# Patient Record
Sex: MALE | Race: WHITE | NOT HISPANIC OR LATINO | Employment: OTHER | URBAN - METROPOLITAN AREA
[De-identification: names, ages, dates, MRNs, and addresses within clinical notes are randomized per-mention and may not be internally consistent; named-entity substitution may affect disease eponyms.]

---

## 2024-02-27 ENCOUNTER — APPOINTMENT (OUTPATIENT)
Dept: CARDIOLOGY | Facility: HOSPITAL | Age: 74
DRG: 378 | End: 2024-02-27
Payer: MEDICARE

## 2024-02-27 ENCOUNTER — APPOINTMENT (OUTPATIENT)
Dept: RADIOLOGY | Facility: HOSPITAL | Age: 74
DRG: 378 | End: 2024-02-27
Payer: MEDICARE

## 2024-02-27 ENCOUNTER — HOSPITAL ENCOUNTER (INPATIENT)
Facility: HOSPITAL | Age: 74
LOS: 3 days | Discharge: HOME | DRG: 378 | End: 2024-03-01
Attending: STUDENT IN AN ORGANIZED HEALTH CARE EDUCATION/TRAINING PROGRAM | Admitting: INTERNAL MEDICINE
Payer: MEDICARE

## 2024-02-27 DIAGNOSIS — I95.9 HYPOTENSION, UNSPECIFIED HYPOTENSION TYPE: Primary | ICD-10-CM

## 2024-02-27 DIAGNOSIS — Q27.30 AVM (ARTERIOVENOUS MALFORMATION) (HHS-HCC): ICD-10-CM

## 2024-02-27 DIAGNOSIS — D50.0 IRON DEFICIENCY ANEMIA DUE TO CHRONIC BLOOD LOSS: ICD-10-CM

## 2024-02-27 DIAGNOSIS — I27.20 PULMONARY HYPERTENSION (MULTI): ICD-10-CM

## 2024-02-27 DIAGNOSIS — I10 ESSENTIAL HYPERTENSION: ICD-10-CM

## 2024-02-27 DIAGNOSIS — K25.4 GASTROINTESTINAL HEMORRHAGE ASSOCIATED WITH GASTRIC ULCER: ICD-10-CM

## 2024-02-27 PROBLEM — D64.9 ANEMIA, UNSPECIFIED: Status: ACTIVE | Noted: 2024-02-27

## 2024-02-27 PROBLEM — R53.1 WEAKNESS: Status: ACTIVE | Noted: 2024-02-27

## 2024-02-27 LAB
ABO GROUP (TYPE) IN BLOOD: NORMAL
ALBUMIN SERPL BCP-MCNC: 3.6 G/DL (ref 3.4–5)
ALP SERPL-CCNC: 90 U/L (ref 33–136)
ALT SERPL W P-5'-P-CCNC: 11 U/L (ref 10–52)
ANION GAP SERPL CALC-SCNC: 12 MMOL/L (ref 10–20)
ANTIBODY SCREEN: NORMAL
APPEARANCE UR: CLEAR
AST SERPL W P-5'-P-CCNC: 9 U/L (ref 9–39)
BASOPHILS # BLD AUTO: 0.01 X10*3/UL (ref 0–0.1)
BASOPHILS NFR BLD AUTO: 0.2 %
BILIRUB SERPL-MCNC: 0.6 MG/DL (ref 0–1.2)
BILIRUB UR STRIP.AUTO-MCNC: NEGATIVE MG/DL
BUN SERPL-MCNC: 89 MG/DL (ref 6–23)
CALCIUM SERPL-MCNC: 9.1 MG/DL (ref 8.6–10.3)
CARDIAC TROPONIN I PNL SERPL HS: 7 NG/L (ref 0–20)
CARDIAC TROPONIN I PNL SERPL HS: 7 NG/L (ref 0–20)
CHLORIDE SERPL-SCNC: 102 MMOL/L (ref 98–107)
CO2 SERPL-SCNC: 21 MMOL/L (ref 21–32)
COLOR UR: YELLOW
CREAT SERPL-MCNC: 2.1 MG/DL (ref 0.5–1.3)
EGFRCR SERPLBLD CKD-EPI 2021: 32 ML/MIN/1.73M*2
EOSINOPHIL # BLD AUTO: 0.24 X10*3/UL (ref 0–0.4)
EOSINOPHIL NFR BLD AUTO: 4 %
ERYTHROCYTE [DISTWIDTH] IN BLOOD BY AUTOMATED COUNT: 16.2 % (ref 11.5–14.5)
ERYTHROCYTE [DISTWIDTH] IN BLOOD BY AUTOMATED COUNT: 16.2 % (ref 11.5–14.5)
GLUCOSE SERPL-MCNC: 100 MG/DL (ref 74–99)
GLUCOSE UR STRIP.AUTO-MCNC: ABNORMAL MG/DL
HCT VFR BLD AUTO: 29.5 % (ref 41–52)
HCT VFR BLD AUTO: 32.9 % (ref 41–52)
HGB BLD-MCNC: 10 G/DL (ref 13.5–17.5)
HGB BLD-MCNC: 9 G/DL (ref 13.5–17.5)
HOLD SPECIMEN: NORMAL
HOLD SPECIMEN: NORMAL
IMM GRANULOCYTES # BLD AUTO: 0.01 X10*3/UL (ref 0–0.5)
IMM GRANULOCYTES NFR BLD AUTO: 0.2 % (ref 0–0.9)
KETONES UR STRIP.AUTO-MCNC: NEGATIVE MG/DL
LEUKOCYTE ESTERASE UR QL STRIP.AUTO: NEGATIVE
LYMPHOCYTES # BLD AUTO: 0.77 X10*3/UL (ref 0.8–3)
LYMPHOCYTES NFR BLD AUTO: 12.8 %
MAGNESIUM SERPL-MCNC: 2.31 MG/DL (ref 1.6–2.4)
MCH RBC QN AUTO: 28.8 PG (ref 26–34)
MCH RBC QN AUTO: 29.2 PG (ref 26–34)
MCHC RBC AUTO-ENTMCNC: 30.4 G/DL (ref 32–36)
MCHC RBC AUTO-ENTMCNC: 30.5 G/DL (ref 32–36)
MCV RBC AUTO: 95 FL (ref 80–100)
MCV RBC AUTO: 96 FL (ref 80–100)
MONOCYTES # BLD AUTO: 0.63 X10*3/UL (ref 0.05–0.8)
MONOCYTES NFR BLD AUTO: 10.5 %
NEUTROPHILS # BLD AUTO: 4.34 X10*3/UL (ref 1.6–5.5)
NEUTROPHILS NFR BLD AUTO: 72.3 %
NITRITE UR QL STRIP.AUTO: NEGATIVE
NRBC BLD-RTO: 0 /100 WBCS (ref 0–0)
NRBC BLD-RTO: 0 /100 WBCS (ref 0–0)
PH UR STRIP.AUTO: 5 [PH]
PHOSPHATE SERPL-MCNC: 4 MG/DL (ref 2.5–4.9)
PLATELET # BLD AUTO: 197 X10*3/UL (ref 150–450)
PLATELET # BLD AUTO: 229 X10*3/UL (ref 150–450)
POTASSIUM SERPL-SCNC: 5.3 MMOL/L (ref 3.5–5.3)
PROT SERPL-MCNC: 6.3 G/DL (ref 6.4–8.2)
PROT UR STRIP.AUTO-MCNC: NEGATIVE MG/DL
RBC # BLD AUTO: 3.08 X10*6/UL (ref 4.5–5.9)
RBC # BLD AUTO: 3.47 X10*6/UL (ref 4.5–5.9)
RBC # UR STRIP.AUTO: NEGATIVE /UL
RH FACTOR (ANTIGEN D): NORMAL
SODIUM SERPL-SCNC: 130 MMOL/L (ref 136–145)
SP GR UR STRIP.AUTO: 1.01
UROBILINOGEN UR STRIP.AUTO-MCNC: <2 MG/DL
WBC # BLD AUTO: 5.1 X10*3/UL (ref 4.4–11.3)
WBC # BLD AUTO: 6 X10*3/UL (ref 4.4–11.3)

## 2024-02-27 PROCEDURE — 85027 COMPLETE CBC AUTOMATED: CPT | Performed by: NURSE PRACTITIONER

## 2024-02-27 PROCEDURE — 81003 URINALYSIS AUTO W/O SCOPE: CPT

## 2024-02-27 PROCEDURE — 86901 BLOOD TYPING SEROLOGIC RH(D): CPT

## 2024-02-27 PROCEDURE — 2500000002 HC RX 250 W HCPCS SELF ADMINISTERED DRUGS (ALT 637 FOR MEDICARE OP, ALT 636 FOR OP/ED): Performed by: NURSE PRACTITIONER

## 2024-02-27 PROCEDURE — 93005 ELECTROCARDIOGRAM TRACING: CPT

## 2024-02-27 PROCEDURE — 96374 THER/PROPH/DIAG INJ IV PUSH: CPT

## 2024-02-27 PROCEDURE — 84100 ASSAY OF PHOSPHORUS: CPT

## 2024-02-27 PROCEDURE — 71046 X-RAY EXAM CHEST 2 VIEWS: CPT | Mod: COMPUTED RADIOGRAPHY X-RAY | Performed by: RADIOLOGY

## 2024-02-27 PROCEDURE — 2500000001 HC RX 250 WO HCPCS SELF ADMINISTERED DRUGS (ALT 637 FOR MEDICARE OP): Performed by: NURSE PRACTITIONER

## 2024-02-27 PROCEDURE — C9113 INJ PANTOPRAZOLE SODIUM, VIA: HCPCS

## 2024-02-27 PROCEDURE — 85025 COMPLETE CBC W/AUTO DIFF WBC: CPT

## 2024-02-27 PROCEDURE — 36415 COLL VENOUS BLD VENIPUNCTURE: CPT

## 2024-02-27 PROCEDURE — 80053 COMPREHEN METABOLIC PANEL: CPT

## 2024-02-27 PROCEDURE — 99285 EMERGENCY DEPT VISIT HI MDM: CPT | Performed by: STUDENT IN AN ORGANIZED HEALTH CARE EDUCATION/TRAINING PROGRAM

## 2024-02-27 PROCEDURE — 83735 ASSAY OF MAGNESIUM: CPT

## 2024-02-27 PROCEDURE — 1200000002 HC GENERAL ROOM WITH TELEMETRY DAILY

## 2024-02-27 PROCEDURE — 2500000004 HC RX 250 GENERAL PHARMACY W/ HCPCS (ALT 636 FOR OP/ED)

## 2024-02-27 PROCEDURE — 96361 HYDRATE IV INFUSION ADD-ON: CPT

## 2024-02-27 PROCEDURE — 71046 X-RAY EXAM CHEST 2 VIEWS: CPT | Mod: FY

## 2024-02-27 PROCEDURE — 84484 ASSAY OF TROPONIN QUANT: CPT

## 2024-02-27 PROCEDURE — 99285 EMERGENCY DEPT VISIT HI MDM: CPT | Mod: 25

## 2024-02-27 RX ORDER — ASPIRIN 81 MG/1
81 TABLET ORAL DAILY
COMMUNITY

## 2024-02-27 RX ORDER — TAMSULOSIN HYDROCHLORIDE 0.4 MG/1
0.8 CAPSULE ORAL NIGHTLY
COMMUNITY

## 2024-02-27 RX ORDER — CHOLECALCIFEROL (VITAMIN D3) 25 MCG
1000 TABLET ORAL DAILY
Status: DISCONTINUED | OUTPATIENT
Start: 2024-02-28 | End: 2024-03-01 | Stop reason: HOSPADM

## 2024-02-27 RX ORDER — FINASTERIDE 5 MG/1
5 TABLET, FILM COATED ORAL DAILY
Status: DISCONTINUED | OUTPATIENT
Start: 2024-02-28 | End: 2024-03-01 | Stop reason: HOSPADM

## 2024-02-27 RX ORDER — ATORVASTATIN CALCIUM 80 MG/1
80 TABLET, FILM COATED ORAL NIGHTLY
COMMUNITY

## 2024-02-27 RX ORDER — ZINC GLUCONATE 50 MG
50 TABLET ORAL DAILY
COMMUNITY

## 2024-02-27 RX ORDER — METOPROLOL SUCCINATE 50 MG/1
50 TABLET, EXTENDED RELEASE ORAL DAILY
Status: DISCONTINUED | OUTPATIENT
Start: 2024-02-28 | End: 2024-02-28

## 2024-02-27 RX ORDER — ASCORBIC ACID 500 MG
500 TABLET ORAL DAILY
COMMUNITY

## 2024-02-27 RX ORDER — ALLOPURINOL 300 MG/1
300 TABLET ORAL DAILY
COMMUNITY

## 2024-02-27 RX ORDER — FUROSEMIDE 40 MG/1
40 TABLET ORAL DAILY PRN
COMMUNITY
End: 2024-03-01 | Stop reason: HOSPADM

## 2024-02-27 RX ORDER — PANTOPRAZOLE SODIUM 40 MG/10ML
40 INJECTION, POWDER, LYOPHILIZED, FOR SOLUTION INTRAVENOUS 2 TIMES DAILY
Status: DISCONTINUED | OUTPATIENT
Start: 2024-02-28 | End: 2024-03-01 | Stop reason: HOSPADM

## 2024-02-27 RX ORDER — ATORVASTATIN CALCIUM 80 MG/1
80 TABLET, FILM COATED ORAL NIGHTLY
Status: DISCONTINUED | OUTPATIENT
Start: 2024-02-27 | End: 2024-03-01 | Stop reason: HOSPADM

## 2024-02-27 RX ORDER — LISINOPRIL 20 MG/1
20 TABLET ORAL DAILY
COMMUNITY
End: 2024-03-01 | Stop reason: HOSPADM

## 2024-02-27 RX ORDER — POLYETHYLENE GLYCOL 3350 17 G/17G
17 POWDER, FOR SOLUTION ORAL DAILY PRN
Status: DISCONTINUED | OUTPATIENT
Start: 2024-02-27 | End: 2024-03-01 | Stop reason: HOSPADM

## 2024-02-27 RX ORDER — TRAMADOL HYDROCHLORIDE 50 MG/1
50 TABLET ORAL EVERY 8 HOURS PRN
Status: DISCONTINUED | OUTPATIENT
Start: 2024-02-27 | End: 2024-03-01 | Stop reason: HOSPADM

## 2024-02-27 RX ORDER — TALC
3 POWDER (GRAM) TOPICAL DAILY PRN
Status: DISCONTINUED | OUTPATIENT
Start: 2024-02-27 | End: 2024-03-01 | Stop reason: HOSPADM

## 2024-02-27 RX ORDER — MUPIROCIN 20 MG/G
1 OINTMENT TOPICAL 2 TIMES DAILY PRN
COMMUNITY

## 2024-02-27 RX ORDER — METOPROLOL SUCCINATE 100 MG/1
50 TABLET, EXTENDED RELEASE ORAL DAILY
COMMUNITY
End: 2024-03-01 | Stop reason: HOSPADM

## 2024-02-27 RX ORDER — MULTIVIT-MIN/IRON FUM/FOLIC AC 7.5 MG-4
1 TABLET ORAL DAILY
Status: DISCONTINUED | OUTPATIENT
Start: 2024-02-28 | End: 2024-03-01 | Stop reason: HOSPADM

## 2024-02-27 RX ORDER — EPLERENONE 25 MG/1
25 TABLET, FILM COATED ORAL DAILY
COMMUNITY

## 2024-02-27 RX ORDER — PANTOPRAZOLE SODIUM 40 MG/10ML
80 INJECTION, POWDER, LYOPHILIZED, FOR SOLUTION INTRAVENOUS ONCE
Status: COMPLETED | OUTPATIENT
Start: 2024-02-27 | End: 2024-02-27

## 2024-02-27 RX ORDER — CHOLECALCIFEROL (VITAMIN D3) 25 MCG
1000 TABLET ORAL DAILY
COMMUNITY

## 2024-02-27 RX ORDER — TAMSULOSIN HYDROCHLORIDE 0.4 MG/1
0.8 CAPSULE ORAL NIGHTLY
Status: DISCONTINUED | OUTPATIENT
Start: 2024-02-27 | End: 2024-03-01 | Stop reason: HOSPADM

## 2024-02-27 RX ORDER — EPLERENONE 25 MG/1
25 TABLET, FILM COATED ORAL DAILY
Status: DISCONTINUED | OUTPATIENT
Start: 2024-02-28 | End: 2024-03-01 | Stop reason: HOSPADM

## 2024-02-27 RX ORDER — FINASTERIDE 5 MG/1
5 TABLET, FILM COATED ORAL DAILY
COMMUNITY

## 2024-02-27 RX ORDER — MULTIVIT-MIN/IRON FUM/FOLIC AC 7.5 MG-4
1 TABLET ORAL DAILY
COMMUNITY

## 2024-02-27 RX ORDER — TRAMADOL HYDROCHLORIDE 50 MG/1
50 TABLET ORAL EVERY 8 HOURS PRN
COMMUNITY

## 2024-02-27 RX ORDER — FLUTICASONE PROPIONATE 50 MCG
1 SPRAY, SUSPENSION (ML) NASAL DAILY PRN
Status: DISCONTINUED | OUTPATIENT
Start: 2024-02-27 | End: 2024-03-01 | Stop reason: HOSPADM

## 2024-02-27 RX ORDER — FLUTICASONE PROPIONATE 50 MCG
1 SPRAY, SUSPENSION (ML) NASAL DAILY PRN
COMMUNITY

## 2024-02-27 RX ADMIN — TAMSULOSIN HYDROCHLORIDE 0.8 MG: 0.4 CAPSULE ORAL at 23:47

## 2024-02-27 RX ADMIN — SODIUM CHLORIDE 1000 ML: 9 INJECTION, SOLUTION INTRAVENOUS at 15:52

## 2024-02-27 RX ADMIN — ATORVASTATIN CALCIUM 80 MG: 80 TABLET, FILM COATED ORAL at 23:47

## 2024-02-27 RX ADMIN — PANTOPRAZOLE SODIUM 80 MG: 40 INJECTION, POWDER, FOR SOLUTION INTRAVENOUS at 19:40

## 2024-02-27 SDOH — SOCIAL STABILITY: SOCIAL INSECURITY: WERE YOU ABLE TO COMPLETE ALL THE BEHAVIORAL HEALTH SCREENINGS?: YES

## 2024-02-27 SDOH — SOCIAL STABILITY: SOCIAL INSECURITY: HAS ANYONE EVER THREATENED TO HURT YOUR FAMILY OR YOUR PETS?: NO

## 2024-02-27 SDOH — SOCIAL STABILITY: SOCIAL INSECURITY: DO YOU FEEL ANYONE HAS EXPLOITED OR TAKEN ADVANTAGE OF YOU FINANCIALLY OR OF YOUR PERSONAL PROPERTY?: NO

## 2024-02-27 SDOH — SOCIAL STABILITY: SOCIAL INSECURITY: ARE YOU OR HAVE YOU BEEN THREATENED OR ABUSED PHYSICALLY, EMOTIONALLY, OR SEXUALLY BY ANYONE?: NO

## 2024-02-27 SDOH — SOCIAL STABILITY: SOCIAL INSECURITY: DO YOU FEEL UNSAFE GOING BACK TO THE PLACE WHERE YOU ARE LIVING?: NO

## 2024-02-27 SDOH — SOCIAL STABILITY: SOCIAL INSECURITY: ARE THERE ANY APPARENT SIGNS OF INJURIES/BEHAVIORS THAT COULD BE RELATED TO ABUSE/NEGLECT?: NO

## 2024-02-27 SDOH — SOCIAL STABILITY: SOCIAL INSECURITY: ABUSE: ADULT

## 2024-02-27 SDOH — SOCIAL STABILITY: SOCIAL INSECURITY: DOES ANYONE TRY TO KEEP YOU FROM HAVING/CONTACTING OTHER FRIENDS OR DOING THINGS OUTSIDE YOUR HOME?: NO

## 2024-02-27 SDOH — SOCIAL STABILITY: SOCIAL INSECURITY: HAVE YOU HAD THOUGHTS OF HARMING ANYONE ELSE?: NO

## 2024-02-27 ASSESSMENT — LIFESTYLE VARIABLES
HOW OFTEN DO YOU HAVE 6 OR MORE DRINKS ON ONE OCCASION: NEVER
EVER FELT BAD OR GUILTY ABOUT YOUR DRINKING: NO
SKIP TO QUESTIONS 9-10: 1
HOW OFTEN DO YOU HAVE A DRINK CONTAINING ALCOHOL: MONTHLY OR LESS
AUDIT-C TOTAL SCORE: 1
AUDIT-C TOTAL SCORE: 1
HAVE YOU EVER FELT YOU SHOULD CUT DOWN ON YOUR DRINKING: NO
HOW MANY STANDARD DRINKS CONTAINING ALCOHOL DO YOU HAVE ON A TYPICAL DAY: 1 OR 2
EVER HAD A DRINK FIRST THING IN THE MORNING TO STEADY YOUR NERVES TO GET RID OF A HANGOVER: NO
HAVE PEOPLE ANNOYED YOU BY CRITICIZING YOUR DRINKING: NO

## 2024-02-27 ASSESSMENT — PATIENT HEALTH QUESTIONNAIRE - PHQ9
2. FEELING DOWN, DEPRESSED OR HOPELESS: NOT AT ALL
SUM OF ALL RESPONSES TO PHQ9 QUESTIONS 1 & 2: 0
1. LITTLE INTEREST OR PLEASURE IN DOING THINGS: NOT AT ALL

## 2024-02-27 ASSESSMENT — PAIN - FUNCTIONAL ASSESSMENT
PAIN_FUNCTIONAL_ASSESSMENT: 0-10
PAIN_FUNCTIONAL_ASSESSMENT: 0-10

## 2024-02-27 ASSESSMENT — PAIN SCALES - GENERAL
PAINLEVEL_OUTOF10: 8
PAINLEVEL_OUTOF10: 0 - NO PAIN
PAINLEVEL_OUTOF10: 5 - MODERATE PAIN
PAINLEVEL_OUTOF10: 3
PAINLEVEL_OUTOF10: 0 - NO PAIN
PAINLEVEL_OUTOF10: 5 - MODERATE PAIN

## 2024-02-27 ASSESSMENT — COGNITIVE AND FUNCTIONAL STATUS - GENERAL
DAILY ACTIVITIY SCORE: 22
DRESSING REGULAR LOWER BODY CLOTHING: A LITTLE
STANDING UP FROM CHAIR USING ARMS: A LITTLE
DRESSING REGULAR UPPER BODY CLOTHING: A LITTLE
PATIENT BASELINE BEDBOUND: NO
MOBILITY SCORE: 23

## 2024-02-27 ASSESSMENT — ACTIVITIES OF DAILY LIVING (ADL)
BATHING: INDEPENDENT
JUDGMENT_ADEQUATE_SAFELY_COMPLETE_DAILY_ACTIVITIES: YES
LACK_OF_TRANSPORTATION: NO
WALKS IN HOME: INDEPENDENT
ASSISTIVE_DEVICE: EYEGLASSES;OTHER (COMMENT)
GROOMING: NEEDS ASSISTANCE
FEEDING YOURSELF: INDEPENDENT
TOILETING: INDEPENDENT
HEARING - RIGHT EAR: FUNCTIONAL
HEARING - LEFT EAR: FUNCTIONAL
DRESSING YOURSELF: NEEDS ASSISTANCE
ADEQUATE_TO_COMPLETE_ADL: YES
PATIENT'S MEMORY ADEQUATE TO SAFELY COMPLETE DAILY ACTIVITIES?: YES

## 2024-02-27 ASSESSMENT — COLUMBIA-SUICIDE SEVERITY RATING SCALE - C-SSRS
2. HAVE YOU ACTUALLY HAD ANY THOUGHTS OF KILLING YOURSELF?: NO
1. IN THE PAST MONTH, HAVE YOU WISHED YOU WERE DEAD OR WISHED YOU COULD GO TO SLEEP AND NOT WAKE UP?: NO
6. HAVE YOU EVER DONE ANYTHING, STARTED TO DO ANYTHING, OR PREPARED TO DO ANYTHING TO END YOUR LIFE?: NO

## 2024-02-27 ASSESSMENT — PAIN DESCRIPTION - DESCRIPTORS: DESCRIPTORS: ACHING

## 2024-02-27 ASSESSMENT — PAIN DESCRIPTION - PAIN TYPE: TYPE: CHRONIC PAIN

## 2024-02-27 ASSESSMENT — PAIN DESCRIPTION - LOCATION: LOCATION: BACK

## 2024-02-27 NOTE — Clinical Note
Patient tolerated the procedure well and is comfortable with no complaints of pain. Vital signs stable. Arousable prior to transport. Patient transported to PACU 2 via stretcher. Handoff completed.

## 2024-02-27 NOTE — ED PROVIDER NOTES
HPI   Chief Complaint   Patient presents with    Hypotension     [facility] sent here for bp 60/40 and dizzy. Weight loss 16lbs in 4 weeks. Intermittent diarrhea.       HPI       73 year old male patient with CHF, lymphedema, hypertension arriving with hypotension and lightheadedness today along with recent weight loss of approximately 16 lbs in the prior 4-5 weeks.He endorses both diarrhea and constipation. Denies nausea vomiting, fever, chills, chest pain, dyspnea, abdominal pain.                Rattan Coma Scale Score: 15         NIH Stroke Scale: 0             Patient History   History reviewed. No pertinent past medical history.  History reviewed. No pertinent surgical history.  No family history on file.  Social History     Tobacco Use    Smoking status: Former     Types: Cigarettes     Quit date: 2009     Years since quitting: 15.1    Smokeless tobacco: Former   Substance Use Topics    Alcohol use: Not Currently     Comment: former    Drug use: Never       Physical Exam   ED Triage Vitals [02/27/24 1516]   Temperature Heart Rate Respirations BP   37 °C (98.6 °F) 98 16 (!) 85/47      Pulse Ox Temp Source Heart Rate Source Patient Position   95 % Tympanic Monitor Sitting      BP Location FiO2 (%)     Right arm --       Physical Exam    ED Course & MDM   Diagnoses as of 02/27/24 1711   Hypotension, unspecified hypotension type   General: Alert, mild acute distress, well developed, Dehydrated  Head: NCAT  Eyes: Clear conjunctiva, EOMI  ENT: Dry mucosa, no lymphadenopathy  Respiratory: Symmetric aeration. No wheezes, rales, or Rhonchi.  CV: Irregular rhythm, Normal Rate, pulses decreased bilaterally  GI: Soft, minimal generalized tenderness, superficial veins prominent, no guarding, BS normoactive, Distention, No hernia, No palpable mass.  : no flank tenderness, no cva tenderness  MSK: Atraumatic. Full ROM in extremities. Bilateral symmetric intact strength. Lymphedema  Skin: Warm,  "c/d/i  Neuro: AOx3, facial symmetry,   sensorimotor intact in extremities,   CN intact, Nonfocal neurological exam      Medical Decision Making      /59   Pulse 84   Temp 37 °C (98.6 °F) (Temporal)   Resp 16   Ht 1.854 m (6' 1\")   Wt 139 kg (306 lb)   SpO2 96%   BMI 40.37 kg/m²   XR chest 2 views   Final Result   No evidence of consolidating infiltrate or effusion.   Signed by Thomas Mackey MD        Labs Reviewed   CBC WITH AUTO DIFFERENTIAL - Abnormal       Result Value    WBC 6.0      nRBC 0.0      RBC 3.47 (*)     Hemoglobin 10.0 (*)     Hematocrit 32.9 (*)     MCV 95      MCH 28.8      MCHC 30.4 (*)     RDW 16.2 (*)     Platelets 229      Neutrophils % 72.3      Immature Granulocytes %, Automated 0.2      Lymphocytes % 12.8      Monocytes % 10.5      Eosinophils % 4.0      Basophils % 0.2      Neutrophils Absolute 4.34      Immature Granulocytes Absolute, Automated 0.01      Lymphocytes Absolute 0.77 (*)     Monocytes Absolute 0.63      Eosinophils Absolute 0.24      Basophils Absolute 0.01     COMPREHENSIVE METABOLIC PANEL - Abnormal    Glucose 100 (*)     Sodium 130 (*)     Potassium 5.3      Chloride 102      Bicarbonate 21      Anion Gap 12      Urea Nitrogen 89 (*)     Creatinine 2.10 (*)     eGFR 32 (*)     Calcium 9.1      Albumin 3.6      Alkaline Phosphatase 90      Total Protein 6.3 (*)     AST 9      Bilirubin, Total 0.6      ALT 11     MAGNESIUM - Normal    Magnesium 2.31     PHOSPHORUS - Normal    Phosphorus 4.0     SERIAL TROPONIN-INITIAL - Normal    Troponin I, High Sensitivity 7      Narrative:     Less than 99th percentile of normal range cutoff-  Female and children under 18 years old <14 ng/L; Male <21 ng/L: Negative  Repeat testing should be performed if clinically indicated.     Female and children under 18 years old 14-50 ng/L; Male 21-50 ng/L:  Consistent with possible cardiac damage and possible increased clinical   risk. Serial measurements may help to assess extent of " myocardial damage.     >50 ng/L: Consistent with cardiac damage, increased clinical risk and  myocardial infarction. Serial measurements may help assess extent of   myocardial damage.      NOTE: Children less than 1 year old may have higher baseline troponin   levels and results should be interpreted in conjunction with the overall   clinical context.     NOTE: Troponin I testing is performed using a different   testing methodology at [facility] than at other   [facility]. Direct result comparisons should only   be made within the same method.   OCCULT BLOOD X1, STOOL   URINALYSIS WITH REFLEX CULTURE AND MICROSCOPIC    Narrative:     The following orders were created for panel order Urinalysis with Reflex Culture and Microscopic.  Procedure                               Abnormality         Status                     ---------                               -----------         ------                     Urinalysis with Reflex C...[[id number]]                                                 Extra Urine Gray Tube[[id number]]                                                         Please view results for these tests on the individual orders.   URINALYSIS WITH REFLEX CULTURE AND MICROSCOPIC   EXTRA URINE GRAY TUBE   TROPONIN SERIES- (INITIAL, 1 HR)    Narrative:     The following orders were created for panel order Troponin Series, (0, 1 HR).  Procedure                               Abnormality         Status                     ---------                               -----------         ------                     Troponin I, High Sensiti...[[id number]]  Normal              Final result               Troponin, High Sensitivi...[[id number]]                                                   Please view results for these tests on the individual orders.   SERIAL TROPONIN, 1 HOUR   TYPE AND SCREEN     73 year old male patient with CHF, lymphedema, hypertension arriving with hypotension and lightheadedness today  along with recent weight loss of approximately 16 lbs in the prior 4-5 weeks.He endorses both diarrhea and constipation. Denies nausea vomiting, fever, chills, chest pain, dyspnea, abdominal pain.      Prerenal injury noted on CMP - BUN 89 / Cr 2.10 with GFR 32, along with NS given for hyponatremia Na 130. Mg and Phos wnl.    Tn negative decreasing likelihoood of cardiac ischemia.     CBC significant for normocytic anemia with elevated rdw with Hb down four points since 9/23/21. T&S - A positive.   Hemoccult stool collected due to new anemia with hypotension for possible GI bleed.     Fluid responsive hypotension observed here. Improvement in patient blood pressure after fluids. Only 1 L given because of history of CHF.     Patient is admitted to Dr. Frederick for further evaluation of hypotension and anemia.       External Records Reviewed: I reviewed recent and relevant outside records including: prior records  Independent Interpretation of Studies: I independently interpreted: As above  Social Determinants Affecting Care: Diagnostic testing considered: As above    I reviewed the case with the attending ER physician. Patient and/or patient´s representative was counseled regarding labs, imaging, likely diagnosis, and plan.     Nova Coe MD MS  PGY-1, Emergency Medicine    The above documentation was completed with the use of speech recognition software. It may contain dictation errors secondary to limitations of the software.          Nova Coe MD  Resident  02/27/24 1952

## 2024-02-28 ENCOUNTER — APPOINTMENT (OUTPATIENT)
Dept: CARDIOLOGY | Facility: HOSPITAL | Age: 74
DRG: 378 | End: 2024-02-28
Payer: MEDICARE

## 2024-02-28 PROBLEM — I25.10 CAD (CORONARY ARTERY DISEASE): Status: ACTIVE | Noted: 2024-02-28

## 2024-02-28 PROBLEM — M10.9 GOUT: Status: ACTIVE | Noted: 2024-02-28

## 2024-02-28 PROBLEM — I89.0 LYMPHEDEMA: Status: ACTIVE | Noted: 2024-02-28

## 2024-02-28 PROBLEM — M19.90 OSTEOARTHRITIS: Status: ACTIVE | Noted: 2024-02-28

## 2024-02-28 PROBLEM — E66.01 MORBID OBESITY (MULTI): Status: ACTIVE | Noted: 2024-02-28

## 2024-02-28 PROBLEM — G47.33 OBSTRUCTIVE SLEEP APNEA, ADULT: Status: ACTIVE | Noted: 2024-02-28

## 2024-02-28 PROBLEM — N18.9 ACUTE KIDNEY INJURY SUPERIMPOSED ON CHRONIC KIDNEY DISEASE (CMS-HCC): Status: ACTIVE | Noted: 2024-02-28

## 2024-02-28 PROBLEM — K92.2 GI BLEED: Status: ACTIVE | Noted: 2024-02-28

## 2024-02-28 PROBLEM — E78.5 HLD (HYPERLIPIDEMIA): Status: ACTIVE | Noted: 2024-02-28

## 2024-02-28 PROBLEM — N40.0 BPH (BENIGN PROSTATIC HYPERPLASIA): Status: ACTIVE | Noted: 2024-02-28

## 2024-02-28 PROBLEM — I48.91 A-FIB (MULTI): Status: ACTIVE | Noted: 2024-02-28

## 2024-02-28 PROBLEM — M54.9 BACK PAIN: Status: ACTIVE | Noted: 2024-02-28

## 2024-02-28 PROBLEM — I27.20 PULMONARY HYPERTENSION (MULTI): Status: ACTIVE | Noted: 2024-02-28

## 2024-02-28 PROBLEM — N17.9 ACUTE KIDNEY INJURY SUPERIMPOSED ON CHRONIC KIDNEY DISEASE (CMS-HCC): Status: ACTIVE | Noted: 2024-02-28

## 2024-02-28 PROBLEM — N18.30 CKD (CHRONIC KIDNEY DISEASE), STAGE III (MULTI): Status: ACTIVE | Noted: 2024-02-28

## 2024-02-28 PROBLEM — I10 ESSENTIAL HYPERTENSION: Status: ACTIVE | Noted: 2024-02-28

## 2024-02-28 PROBLEM — I72.3 ANEURYSM ARTERY, ILIAC (CMS-HCC): Status: ACTIVE | Noted: 2024-02-28

## 2024-02-28 PROBLEM — I71.40 AAA (ABDOMINAL AORTIC ANEURYSM) (CMS-HCC): Status: ACTIVE | Noted: 2024-02-28

## 2024-02-28 LAB
ANION GAP SERPL CALC-SCNC: 13 MMOL/L (ref 10–20)
BNP SERPL-MCNC: 210 PG/ML (ref 0–99)
BUN SERPL-MCNC: 81 MG/DL (ref 6–23)
CALCIUM SERPL-MCNC: 8.6 MG/DL (ref 8.6–10.3)
CHLORIDE SERPL-SCNC: 108 MMOL/L (ref 98–107)
CO2 SERPL-SCNC: 18 MMOL/L (ref 21–32)
CREAT SERPL-MCNC: 1.8 MG/DL (ref 0.5–1.3)
EGFRCR SERPLBLD CKD-EPI 2021: 39 ML/MIN/1.73M*2
ERYTHROCYTE [DISTWIDTH] IN BLOOD BY AUTOMATED COUNT: 16.3 % (ref 11.5–14.5)
FERRITIN SERPL-MCNC: 22 NG/ML (ref 20–300)
FOLATE SERPL-MCNC: 8.8 NG/ML
GLUCOSE BLD MANUAL STRIP-MCNC: 129 MG/DL (ref 74–99)
GLUCOSE SERPL-MCNC: 82 MG/DL (ref 74–99)
HCT VFR BLD AUTO: 28.7 % (ref 41–52)
HEMOCCULT SP1 STL QL: POSITIVE
HGB BLD-MCNC: 8.4 G/DL (ref 13.5–17.5)
HOLD SPECIMEN: NORMAL
INR PPP: 1.5 (ref 0.9–1.1)
IRON SATN MFR SERPL: 8 % (ref 25–45)
IRON SERPL-MCNC: 20 UG/DL (ref 35–150)
MAGNESIUM SERPL-MCNC: 2.32 MG/DL (ref 1.6–2.4)
MCH RBC QN AUTO: 28.2 PG (ref 26–34)
MCHC RBC AUTO-ENTMCNC: 29.3 G/DL (ref 32–36)
MCV RBC AUTO: 96 FL (ref 80–100)
NRBC BLD-RTO: 0 /100 WBCS (ref 0–0)
PHOSPHATE SERPL-MCNC: 3.6 MG/DL (ref 2.5–4.9)
PLATELET # BLD AUTO: 191 X10*3/UL (ref 150–450)
POTASSIUM SERPL-SCNC: 5.2 MMOL/L (ref 3.5–5.3)
PROTHROMBIN TIME: 16.8 SECONDS (ref 9.8–12.8)
RBC # BLD AUTO: 2.98 X10*6/UL (ref 4.5–5.9)
SODIUM SERPL-SCNC: 134 MMOL/L (ref 136–145)
TIBC SERPL-MCNC: 254 UG/DL (ref 240–445)
UIBC SERPL-MCNC: 234 UG/DL (ref 110–370)
VIT B12 SERPL-MCNC: 868 PG/ML (ref 211–911)
WBC # BLD AUTO: 4.8 X10*3/UL (ref 4.4–11.3)

## 2024-02-28 PROCEDURE — 82728 ASSAY OF FERRITIN: CPT | Performed by: NURSE PRACTITIONER

## 2024-02-28 PROCEDURE — 82746 ASSAY OF FOLIC ACID SERUM: CPT | Mod: STJLAB | Performed by: NURSE PRACTITIONER

## 2024-02-28 PROCEDURE — 83540 ASSAY OF IRON: CPT | Performed by: NURSE PRACTITIONER

## 2024-02-28 PROCEDURE — 85610 PROTHROMBIN TIME: CPT | Performed by: STUDENT IN AN ORGANIZED HEALTH CARE EDUCATION/TRAINING PROGRAM

## 2024-02-28 PROCEDURE — 36415 COLL VENOUS BLD VENIPUNCTURE: CPT | Performed by: STUDENT IN AN ORGANIZED HEALTH CARE EDUCATION/TRAINING PROGRAM

## 2024-02-28 PROCEDURE — A4216 STERILE WATER/SALINE, 10 ML: HCPCS

## 2024-02-28 PROCEDURE — 93005 ELECTROCARDIOGRAM TRACING: CPT

## 2024-02-28 PROCEDURE — 97165 OT EVAL LOW COMPLEX 30 MIN: CPT | Mod: GO

## 2024-02-28 PROCEDURE — 82607 VITAMIN B-12: CPT | Mod: STJLAB | Performed by: NURSE PRACTITIONER

## 2024-02-28 PROCEDURE — 80048 BASIC METABOLIC PNL TOTAL CA: CPT | Performed by: NURSE PRACTITIONER

## 2024-02-28 PROCEDURE — 82270 OCCULT BLOOD FECES: CPT | Performed by: NURSE PRACTITIONER

## 2024-02-28 PROCEDURE — 2500000004 HC RX 250 GENERAL PHARMACY W/ HCPCS (ALT 636 FOR OP/ED)

## 2024-02-28 PROCEDURE — 99223 1ST HOSP IP/OBS HIGH 75: CPT | Performed by: NURSE PRACTITIONER

## 2024-02-28 PROCEDURE — 2500000001 HC RX 250 WO HCPCS SELF ADMINISTERED DRUGS (ALT 637 FOR MEDICARE OP): Performed by: NURSE PRACTITIONER

## 2024-02-28 PROCEDURE — 97161 PT EVAL LOW COMPLEX 20 MIN: CPT | Mod: GP | Performed by: PHYSICAL THERAPIST

## 2024-02-28 PROCEDURE — 1200000002 HC GENERAL ROOM WITH TELEMETRY DAILY

## 2024-02-28 PROCEDURE — 83880 ASSAY OF NATRIURETIC PEPTIDE: CPT | Performed by: STUDENT IN AN ORGANIZED HEALTH CARE EDUCATION/TRAINING PROGRAM

## 2024-02-28 PROCEDURE — C9113 INJ PANTOPRAZOLE SODIUM, VIA: HCPCS | Performed by: NURSE PRACTITIONER

## 2024-02-28 PROCEDURE — 82947 ASSAY GLUCOSE BLOOD QUANT: CPT

## 2024-02-28 PROCEDURE — 2500000004 HC RX 250 GENERAL PHARMACY W/ HCPCS (ALT 636 FOR OP/ED): Performed by: NURSE PRACTITIONER

## 2024-02-28 PROCEDURE — 83735 ASSAY OF MAGNESIUM: CPT | Performed by: NURSE PRACTITIONER

## 2024-02-28 PROCEDURE — 36415 COLL VENOUS BLD VENIPUNCTURE: CPT | Performed by: NURSE PRACTITIONER

## 2024-02-28 PROCEDURE — 84100 ASSAY OF PHOSPHORUS: CPT | Performed by: NURSE PRACTITIONER

## 2024-02-28 PROCEDURE — 93010 ELECTROCARDIOGRAM REPORT: CPT | Performed by: INTERNAL MEDICINE

## 2024-02-28 PROCEDURE — 85027 COMPLETE CBC AUTOMATED: CPT | Performed by: NURSE PRACTITIONER

## 2024-02-28 RX ORDER — METOPROLOL SUCCINATE 25 MG/1
25 TABLET, EXTENDED RELEASE ORAL DAILY
Status: DISCONTINUED | OUTPATIENT
Start: 2024-02-29 | End: 2024-03-01 | Stop reason: HOSPADM

## 2024-02-28 RX ORDER — SODIUM CHLORIDE 9 MG/ML
100 INJECTION, SOLUTION INTRAVENOUS CONTINUOUS
Status: ACTIVE | OUTPATIENT
Start: 2024-02-28 | End: 2024-02-28

## 2024-02-28 RX ORDER — SODIUM CHLORIDE 9 MG/ML
100 INJECTION, SOLUTION INTRAVENOUS CONTINUOUS
Status: ACTIVE | OUTPATIENT
Start: 2024-02-28 | End: 2024-02-29

## 2024-02-28 RX ORDER — SODIUM CHLORIDE 9 MG/ML
INJECTION, SOLUTION INTRAMUSCULAR; INTRAVENOUS; SUBCUTANEOUS
Status: COMPLETED
Start: 2024-02-28 | End: 2024-02-28

## 2024-02-28 RX ORDER — DOCUSATE SODIUM 100 MG/1
100 CAPSULE, LIQUID FILLED ORAL 2 TIMES DAILY
Status: DISCONTINUED | OUTPATIENT
Start: 2024-02-28 | End: 2024-03-01 | Stop reason: HOSPADM

## 2024-02-28 RX ORDER — IPRATROPIUM BROMIDE AND ALBUTEROL SULFATE 2.5; .5 MG/3ML; MG/3ML
3 SOLUTION RESPIRATORY (INHALATION) EVERY 2 HOUR PRN
Status: DISCONTINUED | OUTPATIENT
Start: 2024-02-28 | End: 2024-03-01 | Stop reason: HOSPADM

## 2024-02-28 RX ORDER — SODIUM CHLORIDE 9 MG/ML
INJECTION, SOLUTION INTRAMUSCULAR; INTRAVENOUS; SUBCUTANEOUS
Status: DISPENSED
Start: 2024-02-28 | End: 2024-02-29

## 2024-02-28 RX ADMIN — EPLERENONE 25 MG: 25 TABLET, FILM COATED ORAL at 09:54

## 2024-02-28 RX ADMIN — Medication 1 TABLET: at 09:54

## 2024-02-28 RX ADMIN — FINASTERIDE 5 MG: 5 TABLET, FILM COATED ORAL at 09:55

## 2024-02-28 RX ADMIN — DOCUSATE SODIUM 100 MG: 100 CAPSULE, LIQUID FILLED ORAL at 22:06

## 2024-02-28 RX ADMIN — PANTOPRAZOLE SODIUM 40 MG: 40 INJECTION, POWDER, FOR SOLUTION INTRAVENOUS at 09:54

## 2024-02-28 RX ADMIN — SODIUM CHLORIDE 500 ML: 9 INJECTION, SOLUTION INTRAVENOUS at 06:19

## 2024-02-28 RX ADMIN — SODIUM CHLORIDE 100 ML/HR: 9 INJECTION, SOLUTION INTRAVENOUS at 01:52

## 2024-02-28 RX ADMIN — SODIUM CHLORIDE 500 ML: 9 INJECTION, SOLUTION INTRAVENOUS at 17:01

## 2024-02-28 RX ADMIN — SODIUM CHLORIDE 100 ML/HR: 9 INJECTION, SOLUTION INTRAVENOUS at 09:53

## 2024-02-28 RX ADMIN — PANTOPRAZOLE SODIUM 40 MG: 40 INJECTION, POWDER, FOR SOLUTION INTRAVENOUS at 21:00

## 2024-02-28 RX ADMIN — Medication 1000 UNITS: at 09:55

## 2024-02-28 RX ADMIN — SODIUM CHLORIDE 10 ML: 9 INJECTION, SOLUTION INTRAMUSCULAR; INTRAVENOUS; SUBCUTANEOUS at 22:21

## 2024-02-28 RX ADMIN — ATORVASTATIN CALCIUM 80 MG: 80 TABLET, FILM COATED ORAL at 22:05

## 2024-02-28 RX ADMIN — SODIUM CHLORIDE 500 ML: 9 INJECTION, SOLUTION INTRAVENOUS at 11:47

## 2024-02-28 ASSESSMENT — COGNITIVE AND FUNCTIONAL STATUS - GENERAL
MOVING TO AND FROM BED TO CHAIR: A LITTLE
MOVING TO AND FROM BED TO CHAIR: A LITTLE
MOVING FROM LYING ON BACK TO SITTING ON SIDE OF FLAT BED WITH BEDRAILS: A LITTLE
DAILY ACTIVITIY SCORE: 22
TURNING FROM BACK TO SIDE WHILE IN FLAT BAD: A LITTLE
TURNING FROM BACK TO SIDE WHILE IN FLAT BAD: A LITTLE
DRESSING REGULAR LOWER BODY CLOTHING: A LITTLE
STANDING UP FROM CHAIR USING ARMS: A LITTLE
HELP NEEDED FOR BATHING: A LITTLE
TOILETING: A LITTLE
STANDING UP FROM CHAIR USING ARMS: A LITTLE
DRESSING REGULAR LOWER BODY CLOTHING: A LITTLE
MOBILITY SCORE: 18
DRESSING REGULAR UPPER BODY CLOTHING: A LITTLE
CLIMB 3 TO 5 STEPS WITH RAILING: A LITTLE
PERSONAL GROOMING: A LITTLE
STANDING UP FROM CHAIR USING ARMS: A LITTLE
MOVING FROM LYING ON BACK TO SITTING ON SIDE OF FLAT BED WITH BEDRAILS: A LITTLE
MOBILITY SCORE: 23
HELP NEEDED FOR BATHING: A LITTLE
TOILETING: A LITTLE
WALKING IN HOSPITAL ROOM: A LITTLE
DAILY ACTIVITIY SCORE: 21
CLIMB 3 TO 5 STEPS WITH RAILING: A LITTLE
WALKING IN HOSPITAL ROOM: A LITTLE
TOILETING: A LITTLE
DRESSING REGULAR LOWER BODY CLOTHING: A LITTLE
MOBILITY SCORE: 18
DAILY ACTIVITIY SCORE: 19

## 2024-02-28 ASSESSMENT — PAIN SCALES - GENERAL
PAINLEVEL_OUTOF10: 2
PAINLEVEL_OUTOF10: 0 - NO PAIN
PAINLEVEL_OUTOF10: 5 - MODERATE PAIN
PAINLEVEL_OUTOF10: 0 - NO PAIN
PAINLEVEL_OUTOF10: 5 - MODERATE PAIN

## 2024-02-28 ASSESSMENT — PAIN - FUNCTIONAL ASSESSMENT
PAIN_FUNCTIONAL_ASSESSMENT: 0-10

## 2024-02-28 ASSESSMENT — ACTIVITIES OF DAILY LIVING (ADL)
ADL_ASSISTANCE: INDEPENDENT
ADL_ASSISTANCE: INDEPENDENT

## 2024-02-28 ASSESSMENT — PAIN DESCRIPTION - DESCRIPTORS: DESCRIPTORS: ACHING

## 2024-02-28 NOTE — PROGRESS NOTES
02/28/24 1143   Discharge Planning   Living Arrangements Spouse/significant other;Children   Support Systems Spouse/significant other   Assistance Needed wife assisting in the past weeks   Type of Residence Private residence   Home or Post Acute Services None   Patient expects to be discharged to: home   Does the patient need discharge transport arranged? No     Met with the patient in the room, he states that he lives at home with his wife an adult son. He has a rollator at home, also  uses a cane. He states that in the past weeks he has required more help. PT/OT notes are pending. His current plan is to return home when discharged. His wife or son will drive him home.

## 2024-02-28 NOTE — H&P
History Of Present Illness  Reyes Cornelius is a 73 y.o. male presenting with past medical history of atrial fibrillation on Eliquis coronary artery disease status post PTCA hypertension chronic kidney disease admitted to the hospital secondary to hypotension and dizziness patient was seen by his PCP in the office and his blood pressure was 60/40 and he was dizzy patient was transferred to the emergency room and given IV fluid continue to have dizziness patient recently following strict diet.     Past Medical History  He has a past medical history of A-fib (CMS/[facility]) (02/28/2024), AAA (abdominal aortic aneurysm) (CMS/[facility]) (02/28/2024), Anemia, unspecified (02/27/2024), Aneurysm artery, iliac (CMS/[facility]) (02/28/2024), Back pain (02/28/2024), BPH (benign prostatic hyperplasia) (02/28/2024), CAD (coronary artery disease) (02/28/2024), CKD (chronic kidney disease), stage III (CMS/[facility]) (02/28/2024), Essential hypertension (02/28/2024), GI bleed (02/28/2024), Gout (02/28/2024), Hernia, abdominal, HLD (hyperlipidemia) (02/28/2024), CARMITA (iron deficiency anemia), Lymphedema (02/28/2024), Morbid obesity (CMS/[facility]) (02/28/2024), Obstructive sleep apnea, adult (02/28/2024), Osteoarthritis (02/28/2024), and Pulmonary hypertension (CMS/[facility]) (02/28/2024).    Surgical History  He has a past surgical history that includes Total knee arthroplasty (Bilateral); Coronary stent placement; IR angiogram endovascular aortic repair; Adenoidectomy; and Tonsillectomy.     Social History  He reports that he quit smoking about 15 years ago. His smoking use included cigarettes. He has quit using smokeless tobacco. He reports that he does not currently use alcohol. He reports that he does not use drugs.    Family History  Family History   Problem Relation Name Age of Onset    Cataracts Mother      Throat cancer Father      Cataracts Father      Cancer Brother      Heart disease Other      Glaucoma Other      Hypertension Other      Diabetes Other           Allergies  Iodinated contrast media, Tetracycline, Warfarin, and Xarelto [rivaroxaban]    Review of Systems   Constitutional:  Negative for diaphoresis and fatigue.   HENT:  Negative for ear pain, facial swelling, tinnitus and trouble swallowing.    Eyes:  Negative for photophobia and visual disturbance.   Respiratory:  Negative for choking and stridor.    Cardiovascular:  Negative for chest pain and palpitations.   Gastrointestinal:  Negative for abdominal pain, blood in stool and diarrhea.   Endocrine: Negative for cold intolerance, heat intolerance, polydipsia and polyuria.   Musculoskeletal:  Negative for back pain and joint swelling.   Skin:  Negative for color change and rash.   Allergic/Immunologic: Negative for food allergies.   Neurological:  Negative for tremors, facial asymmetry and weakness.   Psychiatric/Behavioral:  The patient is not hyperactive.       Physical Exam  HENT:      Right Ear: External ear normal.      Left Ear: External ear normal.      Mouth/Throat:      Mouth: Mucous membranes are moist.   Cardiovascular:      Rate and Rhythm: Normal rate and regular rhythm.      Heart sounds: No murmur heard.     No friction rub. No gallop.   Pulmonary:      Effort: No accessory muscle usage or respiratory distress.      Breath sounds: No stridor. No wheezing or rhonchi.   Chest:      Chest wall: No tenderness.   Abdominal:      General: There is no distension.      Palpations: There is no mass.      Tenderness: There is no abdominal tenderness. There is no guarding or rebound.   Musculoskeletal:         General: No deformity or signs of injury.      Cervical back: No rigidity or tenderness. Normal range of motion.      Right lower leg: No edema.      Left lower leg: No edema.   Skin:     Coloration: Skin is not jaundiced or pale.      Findings: No lesion.   Neurological:      General: No focal deficit present.      Mental Status: He is alert, oriented to person, place, and time and easily  "aroused.      Cranial Nerves: No cranial nerve deficit.      Sensory: No sensory deficit.      Motor: No weakness.        Last Recorded Vitals  Blood pressure (!) 66/44, pulse (!) 122, temperature 36.4 °C (97.5 °F), resp. rate 18, height 1.854 m (6' 1\"), weight 135 kg (297 lb 6.4 oz), SpO2 96 %.    Labs    Admission on 02/27/2024   Component Date Value Ref Range Status    Ventricular Rate 02/27/2024 85  BPM Preliminary    Atrial Rate 02/27/2024 312  BPM Preliminary    QRS Duration 02/27/2024 90  ms Preliminary    QT Interval 02/27/2024 380  ms Preliminary    QTC Calculation(Bazett) 02/27/2024 452  ms Preliminary    R Axis 02/27/2024 -54  degrees Preliminary    T Axis 02/27/2024 31  degrees Preliminary    QRS Count 02/27/2024 13  beats Preliminary    Q Onset 02/27/2024 211  ms Preliminary    T Offset 02/27/2024 401  ms Preliminary    QTC Fredericia 02/27/2024 426  ms Preliminary    WBC 02/27/2024 6.0  4.4 - 11.3 x10*3/uL Final    nRBC 02/27/2024 0.0  0.0 - 0.0 /100 WBCs Final    RBC 02/27/2024 3.47 (L)  4.50 - 5.90 x10*6/uL Final    Hemoglobin 02/27/2024 10.0 (L)  13.5 - 17.5 g/dL Final    Hematocrit 02/27/2024 32.9 (L)  41.0 - 52.0 % Final    MCV 02/27/2024 95  80 - 100 fL Final    MCH 02/27/2024 28.8  26.0 - 34.0 pg Final    MCHC 02/27/2024 30.4 (L)  32.0 - 36.0 g/dL Final    RDW 02/27/2024 16.2 (H)  11.5 - 14.5 % Final    Platelets 02/27/2024 229  150 - 450 x10*3/uL Final    Neutrophils % 02/27/2024 72.3  40.0 - 80.0 % Final    Immature Granulocytes %, Automated 02/27/2024 0.2  0.0 - 0.9 % Final    Immature Granulocyte Count (IG) includes promyelocytes, myelocytes and metamyelocytes but does not include bands. Percent differential counts (%) should be interpreted in the context of the absolute cell counts (cells/UL).    Lymphocytes % 02/27/2024 12.8  13.0 - 44.0 % Final    Monocytes % 02/27/2024 10.5  2.0 - 10.0 % Final    Eosinophils % 02/27/2024 4.0  0.0 - 6.0 % Final    Basophils % 02/27/2024 0.2  0.0 - 2.0 % " Final    Neutrophils Absolute 02/27/2024 4.34  1.60 - 5.50 x10*3/uL Final    Percent differential counts (%) should be interpreted in the context of the absolute cell counts (cells/uL).    Immature Granulocytes Absolute, Au* 02/27/2024 0.01  0.00 - 0.50 x10*3/uL Final    Lymphocytes Absolute 02/27/2024 0.77 (L)  0.80 - 3.00 x10*3/uL Final    Monocytes Absolute 02/27/2024 0.63  0.05 - 0.80 x10*3/uL Final    Eosinophils Absolute 02/27/2024 0.24  0.00 - 0.40 x10*3/uL Final    Basophils Absolute 02/27/2024 0.01  0.00 - 0.10 x10*3/uL Final    Glucose 02/27/2024 100 (H)  74 - 99 mg/dL Final    Sodium 02/27/2024 130 (L)  136 - 145 mmol/L Final    Potassium 02/27/2024 5.3  3.5 - 5.3 mmol/L Final    Chloride 02/27/2024 102  98 - 107 mmol/L Final    Bicarbonate 02/27/2024 21  21 - 32 mmol/L Final    Anion Gap 02/27/2024 12  10 - 20 mmol/L Final    Urea Nitrogen 02/27/2024 89 (H)  6 - 23 mg/dL Final    Creatinine 02/27/2024 2.10 (H)  0.50 - 1.30 mg/dL Final    eGFR 02/27/2024 32 (L)  >60 mL/min/1.73m*2 Final    Calculations of estimated GFR are performed using the 2021 CKD-EPI Study Refit equation without the race variable for the IDMS-Traceable creatinine methods.  https://jasn.asnjournals.org/content/early/2021/09/22/ASN.2867828372    Calcium 02/27/2024 9.1  8.6 - 10.3 mg/dL Final    Albumin 02/27/2024 3.6  3.4 - 5.0 g/dL Final    Alkaline Phosphatase 02/27/2024 90  33 - 136 U/L Final    Total Protein 02/27/2024 6.3 (L)  6.4 - 8.2 g/dL Final    AST 02/27/2024 9  9 - 39 U/L Final    Bilirubin, Total 02/27/2024 0.6  0.0 - 1.2 mg/dL Final    ALT 02/27/2024 11  10 - 52 U/L Final    Patients treated with Sulfasalazine may generate falsely decreased results for ALT.    Magnesium 02/27/2024 2.31  1.60 - 2.40 mg/dL Final    Phosphorus 02/27/2024 4.0  2.5 - 4.9 mg/dL Final    The performance characteristics of phosphorus testing in heparinized plasma have been validated by the individual  laboratory site where testing is performed.  Testing on heparinized plasma is not approved by the FDA; however, such approval is not necessary.    Color, Urine 02/27/2024 Yellow  Straw, Yellow Final    Appearance, Urine 02/27/2024 Clear  Clear Final    Specific Gravity, Urine 02/27/2024 1.011  1.005 - 1.035 Final    pH, Urine 02/27/2024 5.0  5.0, 5.5, 6.0, 6.5, 7.0, 7.5, 8.0 Final    Protein, Urine 02/27/2024 NEGATIVE  NEGATIVE mg/dL Final    Glucose, Urine 02/27/2024 50 (1+) (A)  NEGATIVE mg/dL Final    Blood, Urine 02/27/2024 NEGATIVE  NEGATIVE Final    Ketones, Urine 02/27/2024 NEGATIVE  NEGATIVE mg/dL Final    Bilirubin, Urine 02/27/2024 NEGATIVE  NEGATIVE Final    Urobilinogen, Urine 02/27/2024 <2.0  <2.0 mg/dL Final    Nitrite, Urine 02/27/2024 NEGATIVE  NEGATIVE Final    Leukocyte Esterase, Urine 02/27/2024 NEGATIVE  NEGATIVE Final    Extra Tube 02/27/2024 Hold for add-ons.   Final    Auto resulted.    Troponin I, High Sensitivity 02/27/2024 7  0 - 20 ng/L Final    Troponin I, High Sensitivity 02/27/2024 7  0 - 20 ng/L Final    Extra Tube 02/27/2024 Hold for add-ons.   Final    Auto resulted.    Extra Tube 02/27/2024 Hold for add-ons.   Final    Auto resulted.    ABO TYPE 02/27/2024 A   Final    Rh TYPE 02/27/2024 POS   Final    ANTIBODY SCREEN 02/27/2024 NEG   Final    Occult Blood, Stool X1 02/28/2024 Positive (A)  Negative Final    WBC 02/27/2024 5.1  4.4 - 11.3 x10*3/uL Final    nRBC 02/27/2024 0.0  0.0 - 0.0 /100 WBCs Final    RBC 02/27/2024 3.08 (L)  4.50 - 5.90 x10*6/uL Final    Hemoglobin 02/27/2024 9.0 (L)  13.5 - 17.5 g/dL Final    Hematocrit 02/27/2024 29.5 (L)  41.0 - 52.0 % Final    MCV 02/27/2024 96  80 - 100 fL Final    MCH 02/27/2024 29.2  26.0 - 34.0 pg Final    MCHC 02/27/2024 30.5 (L)  32.0 - 36.0 g/dL Final    RDW 02/27/2024 16.2 (H)  11.5 - 14.5 % Final    Platelets 02/27/2024 197  150 - 450 x10*3/uL Final    Magnesium 02/28/2024 2.32  1.60 - 2.40 mg/dL Final    WBC 02/28/2024 4.8  4.4 - 11.3 x10*3/uL Final    nRBC 02/28/2024 0.0   0.0 - 0.0 /100 WBCs Final    RBC 02/28/2024 2.98 (L)  4.50 - 5.90 x10*6/uL Final    Hemoglobin 02/28/2024 8.4 (L)  13.5 - 17.5 g/dL Final    Hematocrit 02/28/2024 28.7 (L)  41.0 - 52.0 % Final    MCV 02/28/2024 96  80 - 100 fL Final    MCH 02/28/2024 28.2  26.0 - 34.0 pg Final    MCHC 02/28/2024 29.3 (L)  32.0 - 36.0 g/dL Final    RDW 02/28/2024 16.3 (H)  11.5 - 14.5 % Final    Platelets 02/28/2024 191  150 - 450 x10*3/uL Final    Glucose 02/28/2024 82  74 - 99 mg/dL Final    Sodium 02/28/2024 134 (L)  136 - 145 mmol/L Final    Potassium 02/28/2024 5.2  3.5 - 5.3 mmol/L Final    Chloride 02/28/2024 108 (H)  98 - 107 mmol/L Final    Bicarbonate 02/28/2024 18 (L)  21 - 32 mmol/L Final    Anion Gap 02/28/2024 13  10 - 20 mmol/L Final    Urea Nitrogen 02/28/2024 81 (H)  6 - 23 mg/dL Final    Creatinine 02/28/2024 1.80 (H)  0.50 - 1.30 mg/dL Final    eGFR 02/28/2024 39 (L)  >60 mL/min/1.73m*2 Final    Calculations of estimated GFR are performed using the 2021 CKD-EPI Study Refit equation without the race variable for the IDMS-Traceable creatinine methods.  https://jasn.asnjournals.org/content/early/2021/09/22/ASN.2142233685    Calcium 02/28/2024 8.6  8.6 - 10.3 mg/dL Final    Phosphorus 02/28/2024 3.6  2.5 - 4.9 mg/dL Final    The performance characteristics of phosphorus testing in heparinized plasma have been validated by the individual  laboratory site where testing is performed. Testing on heparinized plasma is not approved by the FDA; however, such approval is not necessary.    Ferritin 02/28/2024 22  20 - 300 ng/mL Final    Iron 02/28/2024 20 (L)  35 - 150 ug/dL Final    UIBC 02/28/2024 234  110 - 370 ug/dL Final    TIBC 02/28/2024 254  240 - 445 ug/dL Final    % Saturation 02/28/2024 8 (L)  25 - 45 % Final         Imaging     ECG 12 lead  Atrial fibrillation with premature ventricular or aberrantly conducted complexes  Left axis deviation  Low voltage QRS  Inferior infarct (cited on or before  30-OCT-2016)  Cannot rule out Anterior infarct (cited on or before 30-OCT-2016)  Abnormal ECG  When compared with ECG of 30-OCT-2016 13:41,  Nonspecific T wave abnormality no longer evident in Lateral leads       Patient Active Problem List   Diagnosis    Weakness    Anemia, unspecified    Hypotension    A-fib (CMS/[facility])    AAA (abdominal aortic aneurysm) (CMS/[facility])    Aneurysm artery, iliac (CMS/[facility])    Back pain    BPH (benign prostatic hyperplasia)    CAD (coronary artery disease)    CKD (chronic kidney disease), stage III (CMS/[facility])    Essential hypertension    GI bleed    Gout    HLD (hyperlipidemia)    Lymphedema    Morbid obesity (CMS/[facility])    Obstructive sleep apnea, adult    Osteoarthritis    Pulmonary hypertension (CMS/[facility])    Acute kidney injury superimposed on chronic kidney disease (CMS/[facility])         Assessment/Plan   Principal Problem:    Weakness  Active Problems:    Anemia, unspecified    Hypotension    A-fib (CMS/[facility])    CAD (coronary artery disease)    HLD (hyperlipidemia)    Acute kidney injury superimposed on chronic kidney disease (CMS/[facility])      Hypotension and weakness and dizziness hold blood pressure medication for now continue with IV hydration  Acute kidney injury continue with IV hydration  History of atrial fibrillation continue on Eliquis  Coronary artery disease currently stable  History of sleep apnea continue with CPAP         CHIKA Frederick MD

## 2024-02-28 NOTE — PROGRESS NOTES
Occupational Therapy    Occupational Therapy    Evaluation    Patient Name: Reyes Cornelius  MRN: [id number]  Today's Date: 2/28/2024  Time Calculation  Start Time: 1039  Stop Time: 1055  Time Calculation (min): 16 min    Assessment  IP OT Assessment  OT Assessment:  (Pt. presents with dizziness and low BP impacting pt. ability to complete ADLs and mobility independently)  Prognosis: Good  Evaluation/Treatment Tolerance:  (Limited by dizziness and orthostatic hypotension)  End of Session Communication: Bedside nurse  End of Session Patient Position: Up in chair, Alarm on    Plan:  Treatment Interventions: ADL retraining, Functional transfer training  OT Frequency: 3 times per week  OT Discharge Recommendations: Low intensity level of continued care  OT Recommended Transfer Status: Stand by assist  OT - OK to Discharge: Yes (Next level od care when cleared by medical team)    Subjective   Per EMR: Reyes Cornelius is a 73 y.o. male presenting to [facility] on 2/27/2024 with pertinent medical history of A-fib on Eliquis, CAD s/p PTCA with 2 stents, HTN, HLD, CKD stage III, AAA, Hx GI bleed, lymphedema, and gout who presents to the ED with hypotension and dizziness via EMS from PCP office.  Patient reports he went to his PCP for 6-month follow-up and his blood pressure was 60/40, thus they called EMS to bring him to the ED for eval.  In addition, patient reported feeling dizzy, generalized weakness, inability to walk more than 20 steps without stopping, and increased fatigue x 2 weeks.  In addition, patient reports losing 16 pounds within the last 4 weeks, however he changed his diet and cut out carbs and has been eating meat/veggies/salads under the recommendation of his cardiologist.  Patient reports his stools have been dark in color, however taking a supplement containing iron.  Denies any bright red blood in stool.  Denies any vomiting with blood.  Denies any recent fever/chills, headache, chest pain / palpitations, cough,  abdominal pain, N/V/D/C, dysuria, or hematuria.     In the ED, initial VS with temp 37.0, HR 98, RR 16, BP 85/47, SpO2 95% on room air.  CBC with hemoglobin 10.0 > 9.0, hematocrit 32.9> 29.5.  CMP with sodium 130, BUN 89, creatinine 2.10, and GFR 32 (creatinine 1.36 from 9/23/2021).  High-sensitivity troponin 1 WNL x 2.  UA 50 glucose.  Chest x-ray negative.  While in the ED patient received Protonix 80 mg IV and normal saline x 1 L.  Patient admitted with anemia, hypotension, and weakness.  Of note, last labs available for comparison were 2 years ago.     Past Medical History  He has a past medical history of A-fib (CMS/HCC) (02/28/2024), AAA (abdominal aortic aneurysm) (CMS/HCC) (02/28/2024), Anemia, unspecified (02/27/2024), Aneurysm artery, iliac (CMS/HCC) (02/28/2024), Back pain (02/28/2024), BPH (benign prostatic hyperplasia) (02/28/2024), CAD (coronary artery disease) (02/28/2024), CKD (chronic kidney disease), stage III (CMS/HCC) (02/28/2024), Essential hypertension (02/28/2024), GI bleed (02/28/2024), Gout (02/28/2024), Hernia, abdominal, HLD (hyperlipidemia) (02/28/2024), CARMITA (iron deficiency anemia), Lymphedema (02/28/2024), Morbid obesity (CMS/HCC) (02/28/2024), Obstructive sleep apnea, adult (02/28/2024), Osteoarthritis (02/28/2024), and Pulmonary hypertension (CMS/HCC) (02/28/2024).     Surgical History  He has a past surgical history that includes Total knee arthroplasty (Bilateral); Coronary stent placement; IR angiogram endovascular aortic repair; Adenoidectomy; and Tonsillectomy.  Current Problem:  1. Hypotension, unspecified hypotension type            General:  General  Reason for Referral: ADLs, discharge planning  Referred By: Dr. Frederick  Family/Caregiver Present: No  Co-Treatment: PT  Co-Treatment Reason: Safety  Prior to Session Communication: Bedside nurse  Patient Position Received: Up in chair, Alarm on    Precautions:  Medical Precautions: Fall precautions  Precautions Comment:  Orthostatic    Vital Signs:  BP:  (sitting 93/57; standing 73/52)    Pain:  Pain Assessment  Pain Assessment: 0-10  Pain Score: 5 - Moderate pain  Pain Type: Chronic pain  Pain Location: Back    Objective     Cognition:  Overall Cognitive Status: Within Functional Limits          Home Living:  Home Living Comments:  (Pt. lives with spouse in home with ramped entry  with bedroom and bathroom on first floor with walk in shower has grab bars and shower chair. PLOF MOD I for ADLs and mobility with rollator or walking stick. Drives.)     Prior Function:  Level of Epes: Independent with ADLs and functional transfers  ADL Assistance: Independent  Homemaking Assistance: Independent  Ambulatory Assistance: Independent      ADL:  Grooming Assistance: Stand by  LE Dressing Assistance: Stand by    Activity Tolerance:  Endurance: Endurance does not limit participation in activity    Bed Mobility/Transfers:      Transfers  Transfer:  (sit <> stand SBA with ww; complains of dizziness)      Sitting Balance:  Static Sitting Balance  Static Sitting-Balance Support: Bilateral upper extremity supported  Static Sitting-Level of Assistance: Independent    Standing Balance:  Static Standing Balance  Static Standing-Balance Support: Bilateral upper extremity supported  Static Standing-Level of Assistance:  (SBA)      Sensation:  Sensation Comment: Numbness to bilateral feet and hands        Hand Function:  Hand Function  Gross Grasp: Functional  Coordination: Functional    Extremities: RUE   RUE : Within Functional Limits and LUE   LUE: Within Functional Limits    Outcome Measures: [facility] Daily Activity  Putting on and taking off regular lower body clothing: A little  Bathing (including washing, rinsing, drying): A little  Putting on and taking off regular upper body clothing: None  Toileting, which includes using toilet, bedpan or urinal: A little  Taking care of personal grooming such as brushing teeth: None  Eating Meals:  None  Daily Activity - Total Score: 21        EDUCATION:  Education  Individual(s) Educated: Patient  Education Provided: Fall precautons, Risk and benefits of OT discussed with patient or other, POC discussed and agreed upon  Patient Response to Education: Patient/Caregiver Verbalized Understanding of Information        Goals:   Encounter Problems       Encounter Problems (Active)       Dressings Lower Extremities       STG - Patient to complete lower body dressing MOD I       Start:  02/28/24    Expected End:  03/13/24               Grooming       STG - Patient completes grooming MOD I       Start:  02/28/24    Expected End:  03/13/24            STG - Patient will tolerate standing 3-6 min       Start:  02/28/24    Expected End:  03/13/24               Transfers       STG - Patient will perform toilet transfer MOD I       Start:  02/28/24    Expected End:  03/13/24

## 2024-02-28 NOTE — CARE PLAN
The patient's goals for the shift include  remaining comfortable throughout shift.    The clinical goals for the shift include see care plan      Problem: Pain - Adult  Goal: Verbalizes/displays adequate comfort level or baseline comfort level  Outcome: Progressing     Problem: Safety - Adult  Goal: Free from fall injury  Outcome: Progressing     Problem: Discharge Planning  Goal: Discharge to home or other facility with appropriate resources  Outcome: Progressing     Problem: Chronic Conditions and Co-morbidities  Goal: Patient's chronic conditions and co-morbidity symptoms are monitored and maintained or improved  Outcome: Progressing     Problem: Fall/Injury  Goal: Not fall by end of shift  Outcome: Progressing  Goal: Be free from injury by end of the shift  Outcome: Progressing  Goal: Verbalize understanding of personal risk factors for fall in the hospital  Outcome: Progressing  Goal: Verbalize understanding of risk factor reduction measures to prevent injury from fall in the home  Outcome: Progressing  Goal: Use assistive devices by end of the shift  Outcome: Progressing  Goal: Pace activities to prevent fatigue by end of the shift  Outcome: Progressing     Problem: Pain  Goal: Takes deep breaths with improved pain control throughout the shift  Outcome: Progressing  Goal: Turns in bed with improved pain control throughout the shift  Outcome: Progressing  Goal: Walks with improved pain control throughout the shift  Outcome: Progressing  Goal: Performs ADL's with improved pain control throughout shift  Outcome: Progressing  Goal: Participates in PT with improved pain control throughout the shift  Outcome: Progressing  Goal: Free from opioid side effects throughout the shift  Outcome: Progressing  Goal: Free from acute confusion related to pain meds throughout the shift  Outcome: Progressing

## 2024-02-28 NOTE — H&P
History Of Present Illness  Reyes Cornelius is a 73 y.o. male presenting to [facility] on 2/27/2024 with pertinent medical history of A-fib on Eliquis, CAD s/p PTCA with 2 stents, HTN, HLD, CKD stage III, AAA, Hx GI bleed, lymphedema, and gout who presents to the ED with hypotension and dizziness via EMS from PCP office.  Patient reports he went to his PCP for 6-month follow-up and his blood pressure was 60/40, thus they called EMS to bring him to the ED for eval.  In addition, patient reported feeling dizzy, generalized weakness, inability to walk more than 20 steps without stopping, and increased fatigue x 2 weeks.  In addition, patient reports losing 16 pounds within the last 4 weeks, however he changed his diet and cut out carbs and has been eating meat/veggies/salads under the recommendation of his cardiologist.  Patient reports his stools have been dark in color, however taking a supplement containing iron.  Denies any bright red blood in stool.  Denies any vomiting with blood.  Denies any recent fever/chills, headache, chest pain / palpitations, cough, abdominal pain, N/V/D/C, dysuria, or hematuria.    In the ED, initial VS with temp 37.0, HR 98, RR 16, BP 85/47, SpO2 95% on room air.  CBC with hemoglobin 10.0 > 9.0, hematocrit 32.9> 29.5.  CMP with sodium 130, BUN 89, creatinine 2.10, and GFR 32 (creatinine 1.36 from 9/23/2021).  High-sensitivity troponin 1 WNL x 2.  UA 50 glucose.  Chest x-ray negative.  While in the ED patient received Protonix 80 mg IV and normal saline x 1 L.  Patient admitted with anemia, hypotension, and weakness.  Of note, last labs available for comparison were 2 years ago.    Past Medical History  He has a past medical history of A-fib (CMS/[facility]) (02/28/2024), AAA (abdominal aortic aneurysm) (CMS/[facility]) (02/28/2024), Anemia, unspecified (02/27/2024), Aneurysm artery, iliac (CMS/[facility]) (02/28/2024), Back pain (02/28/2024), BPH (benign prostatic hyperplasia) (02/28/2024), CAD (coronary artery  disease) (02/28/2024), CKD (chronic kidney disease), stage III (CMS/[facility]) (02/28/2024), Essential hypertension (02/28/2024), GI bleed (02/28/2024), Gout (02/28/2024), Hernia, abdominal, HLD (hyperlipidemia) (02/28/2024), CARMITA (iron deficiency anemia), Lymphedema (02/28/2024), Morbid obesity (CMS/[facility]) (02/28/2024), Obstructive sleep apnea, adult (02/28/2024), Osteoarthritis (02/28/2024), and Pulmonary hypertension (CMS/[facility]) (02/28/2024).    Surgical History  He has a past surgical history that includes Total knee arthroplasty (Bilateral); Coronary stent placement; IR angiogram endovascular aortic repair; Adenoidectomy; and Tonsillectomy.     Social History  He reports that he quit smoking about 15 years ago. His smoking use included cigarettes. He has quit using smokeless tobacco. He reports that he does not currently use alcohol. He reports that he does not use drugs.    Family History  Family History   Problem Relation Name Age of Onset    Cataracts Mother      Throat cancer Father      Cataracts Father      Cancer Brother      Heart disease Other      Glaucoma Other      Hypertension Other      Diabetes Other          Allergies  Iodinated contrast media, Tetracycline, Warfarin, and Xarelto [rivaroxaban]    Review of Systems (Bold words are positive)    Constitutional: NEGATIVE: Fever, Chills, Malaise, Weight Loss, Fatigue     Eyes: NEGATIVE: Blurry Vision, Vision Loss/ Change, Redness, Drainage     ENMT: NEGATIVE: Nasal Discharge, Nasal Congestion, Throat Pain, Mouth Pain, Ear Pain     Respiratory: NEGATIVE: Dry Cough, Productive Cough, Shortness of Breath, Wheezing, Hemoptysis     Cardiac: NEGATIVE: Chest Pain, Dyspnea on Exertion, Palpitations, Orthopnea, Syncope      Gastrointestinal: Negative: Nausea, Vomiting, Diarrhea, Constipation, Abdominal Pain     Genitourinary: NEGATIVE: Dysuria, Frequency, Hematuria, Flank Pain     Musculoskeletal: Negative: Decreased ROM, Pain, Weakness, Swelling     Neurological:  "NEGATIVE: Confusion, Dizziness, Headache, Syncope, Seizures     Psychiatric: NEGATIVE: Anxiety, Depression     Skin: NEGATIVE: Mass, Rash, Pruritus,  Ulcer, Pain     Endocrine: NEGATIVE: Sweat, Thirst, Polydipsia      Hematologic/Lymph: NEGATIVE: Anemia, Bruising, Night Sweats     Allergic/Immunologic: NEGATIVE: Itching, Sneezing        Physical Exam  Constitutional: Awake and alert, Calm, and Cooperative. Speech clear. No acute distress.  Skin: Pink, warm, and dry. No lesions or rashes.  Eyes: PERRL, sclera clear, No swelling or drainage noted  ENMT: Mucous membranes pink and moist, no apparent injury, no lesions seen  Head/Neck: Neck Supple, no apparent injury, trachea midline, no palpable masses on head  Cardiac: irregular rhythm and rate, no murmurs  Lungs: Diminished to auscultation throughout all lobes bilaterally, symmetrical chest rise, no accessory muscle usage, unlabored  Abdomen: Soft, non-tender, no rebound tenderness or guarding, nondistended, positive bowel sounds in all quadrants  Musculoskeletal: ROM intact, no joint swelling, generalized weakness  Extremities: BLE 3+ edema, No cyanosis, 1+ pulses of the extremities, see skin assessment for wounds  Neurological: Alert and Oriented x 3, intact senses, bilateral hand grasps and foot push/pulls equal.  Psychological: Appropriate mood and behavior.       Last Recorded Vitals  Blood pressure 108/62, pulse 88, temperature 36.6 °C (97.9 °F), resp. rate 19, height 1.854 m (6' 1\"), weight 139 kg (306 lb), SpO2 96 %.  Visit Vitals  /62   Pulse 88   Temp 36.6 °C (97.9 °F)   Resp 19   Ht 1.854 m (6' 1\")   Wt 139 kg (306 lb)   SpO2 96%   BMI 40.37 kg/m²   Smoking Status Former   BSA 2.68 m²     Weight: 139 kg (306 lb)   Pain Assessment: 0-10  Pain Score: 3  Pain Type: Chronic pain  Pain Location: Back  Pain Descriptors: Aching        Relevant Results  Results for orders placed or performed during the hospital encounter of 02/27/24 (from the past 24 hour(s)) "   CBC and Auto Differential   Result Value Ref Range    WBC 6.0 4.4 - 11.3 x10*3/uL    nRBC 0.0 0.0 - 0.0 /100 WBCs    RBC 3.47 (L) 4.50 - 5.90 x10*6/uL    Hemoglobin 10.0 (L) 13.5 - 17.5 g/dL    Hematocrit 32.9 (L) 41.0 - 52.0 %    MCV 95 80 - 100 fL    MCH 28.8 26.0 - 34.0 pg    MCHC 30.4 (L) 32.0 - 36.0 g/dL    RDW 16.2 (H) 11.5 - 14.5 %    Platelets 229 150 - 450 x10*3/uL    Neutrophils % 72.3 40.0 - 80.0 %    Immature Granulocytes %, Automated 0.2 0.0 - 0.9 %    Lymphocytes % 12.8 13.0 - 44.0 %    Monocytes % 10.5 2.0 - 10.0 %    Eosinophils % 4.0 0.0 - 6.0 %    Basophils % 0.2 0.0 - 2.0 %    Neutrophils Absolute 4.34 1.60 - 5.50 x10*3/uL    Immature Granulocytes Absolute, Automated 0.01 0.00 - 0.50 x10*3/uL    Lymphocytes Absolute 0.77 (L) 0.80 - 3.00 x10*3/uL    Monocytes Absolute 0.63 0.05 - 0.80 x10*3/uL    Eosinophils Absolute 0.24 0.00 - 0.40 x10*3/uL    Basophils Absolute 0.01 0.00 - 0.10 x10*3/uL   Comprehensive metabolic panel   Result Value Ref Range    Glucose 100 (H) 74 - 99 mg/dL    Sodium 130 (L) 136 - 145 mmol/L    Potassium 5.3 3.5 - 5.3 mmol/L    Chloride 102 98 - 107 mmol/L    Bicarbonate 21 21 - 32 mmol/L    Anion Gap 12 10 - 20 mmol/L    Urea Nitrogen 89 (H) 6 - 23 mg/dL    Creatinine 2.10 (H) 0.50 - 1.30 mg/dL    eGFR 32 (L) >60 mL/min/1.73m*2    Calcium 9.1 8.6 - 10.3 mg/dL    Albumin 3.6 3.4 - 5.0 g/dL    Alkaline Phosphatase 90 33 - 136 U/L    Total Protein 6.3 (L) 6.4 - 8.2 g/dL    AST 9 9 - 39 U/L    Bilirubin, Total 0.6 0.0 - 1.2 mg/dL    ALT 11 10 - 52 U/L   Magnesium   Result Value Ref Range    Magnesium 2.31 1.60 - 2.40 mg/dL   Phosphorus   Result Value Ref Range    Phosphorus 4.0 2.5 - 4.9 mg/dL   Troponin I, High Sensitivity, Initial   Result Value Ref Range    Troponin I, High Sensitivity 7 0 - 20 ng/L   Light Blue Top   Result Value Ref Range    Extra Tube Hold for add-ons.    Lavender Top   Result Value Ref Range    Extra Tube Hold for add-ons.    Type and Screen   Result  Value Ref Range    ABO TYPE A     Rh TYPE POS     ANTIBODY SCREEN NEG    Troponin, High Sensitivity, 1 Hour   Result Value Ref Range    Troponin I, High Sensitivity 7 0 - 20 ng/L   CBC   Result Value Ref Range    WBC 5.1 4.4 - 11.3 x10*3/uL    nRBC 0.0 0.0 - 0.0 /100 WBCs    RBC 3.08 (L) 4.50 - 5.90 x10*6/uL    Hemoglobin 9.0 (L) 13.5 - 17.5 g/dL    Hematocrit 29.5 (L) 41.0 - 52.0 %    MCV 96 80 - 100 fL    MCH 29.2 26.0 - 34.0 pg    MCHC 30.5 (L) 32.0 - 36.0 g/dL    RDW 16.2 (H) 11.5 - 14.5 %    Platelets 197 150 - 450 x10*3/uL   Urinalysis with Reflex Culture and Microscopic   Result Value Ref Range    Color, Urine Yellow Straw, Yellow    Appearance, Urine Clear Clear    Specific Gravity, Urine 1.011 1.005 - 1.035    pH, Urine 5.0 5.0, 5.5, 6.0, 6.5, 7.0, 7.5, 8.0    Protein, Urine NEGATIVE NEGATIVE mg/dL    Glucose, Urine 50 (1+) (A) NEGATIVE mg/dL    Blood, Urine NEGATIVE NEGATIVE    Ketones, Urine NEGATIVE NEGATIVE mg/dL    Bilirubin, Urine NEGATIVE NEGATIVE    Urobilinogen, Urine <2.0 <2.0 mg/dL    Nitrite, Urine NEGATIVE NEGATIVE    Leukocyte Esterase, Urine NEGATIVE NEGATIVE      XR chest 2 views    Result Date: 2/27/2024  STUDY: Chest Radiographs;  2/27/2024 at 4:10 PM. INDICATION: Chest pain. COMPARISON: MR cardiac 6/30/2022. ACCESSION NUMBER(S): [id number] ORDERING CLINICIAN: NADIRA VAZ TECHNIQUE:  Frontal and lateral chest (three images). FINDINGS: CARDIOMEDIASTINAL SILHOUETTE: Cardiomediastinal silhouette is normal in size and configuration.  LUNGS: There are findings consistent with COPD.  ABDOMEN: No remarkable upper abdominal findings.  BONES: No acute osseous changes.    No evidence of consolidating infiltrate or effusion. Signed by Thomas Mackey MD     EKG: A-fib with PVC, ventricular rate 85, ME *, QRS 90, QTc 452. No ST elevation or depression noted.       Home Medications  Prior to Admission medications    Medication Sig Start Date End Date Taking? Authorizing Provider   allopurinol  (Zyloprim) 300 mg tablet Take 1 tablet (300 mg) by mouth once daily.    Historical Provider, MD   apixaban (Eliquis) 5 mg tablet Take 1 tablet (5 mg) by mouth 2 times a day.    Historical Provider, MD   ascorbic acid (Vitamin C) 500 mg tablet Take 1 tablet (500 mg) by mouth once daily.    Historical Provider, MD   aspirin 81 mg EC tablet Take 1 tablet (81 mg) by mouth once daily.    Historical Provider, MD   atorvastatin (Lipitor) 80 mg tablet Take 1 tablet (80 mg) by mouth once daily at bedtime.    Historical Provider, MD   cholecalciferol (Vitamin D-3) 25 MCG (1000 UT) tablet Take 1 tablet (1,000 Units) by mouth once daily.    Historical Provider, MD   empagliflozin (Jardiance) 25 mg Take 1 tablet (25 mg) by mouth once daily.    Historical Provider, MD   eplerenone (Inspra) 25 mg tablet Take 1 tablet (25 mg) by mouth once daily.    Historical Provider, MD   finasteride (Proscar) 5 mg tablet Take 1 tablet (5 mg) by mouth once daily. Do not crush, chew, or split.    Historical Provider, MD   fluticasone (Flonase) 50 mcg/actuation nasal spray Administer 1 spray into each nostril once daily as needed for rhinitis.    Historical Provider, MD   furosemide (Lasix) 40 mg tablet Take 1 tablet (40 mg) by mouth once daily as needed (edema).    Historical Provider, MD   lisinopril 20 mg tablet Take 1 tablet (20 mg) by mouth once daily.    Historical Provider, MD   metoprolol succinate XL (Toprol-XL) 100 mg 24 hr tablet Take 0.5 tablets (50 mg) by mouth once daily.    Historical Provider, MD   multivitamin with minerals tablet Take 1 tablet by mouth once daily.    Historical Provider, MD   mupirocin (Bactroban) 2 % ointment Apply 1 Application topically 2 times a day as needed (wounds).    Historical Provider, MD   tamsulosin (Flomax) 0.4 mg 24 hr capsule Take 2 capsules (0.8 mg) by mouth once daily at bedtime.    Historical Provider, MD   traMADol (Ultram) 50 mg tablet Take 1 tablet (50 mg) by mouth every 8 hours if needed  for severe pain (7 - 10).    Historical Provider, MD   zinc gluconate 50 mg tablet Take 1 tablet (50 mg of elemental zinc) by mouth once daily.    Historical Provider, MD       Medications  Scheduled medications  atorvastatin, 80 mg, oral, Nightly  cholecalciferol, 1,000 Units, oral, Daily  eplerenone, 25 mg, oral, Daily  finasteride, 5 mg, oral, Daily  metoprolol succinate XL, 50 mg, oral, Daily  multivitamin with minerals, 1 tablet, oral, Daily  pantoprazole, 40 mg, intravenous, BID  tamsulosin, 0.8 mg, oral, Nightly      Continuous medications  sodium chloride 0.9%, 100 mL/hr      PRN medications  PRN medications: fluticasone, melatonin, polyethylene glycol, traMADol        Assessment/Plan   Principal Problem:    Weakness  Active Problems:    Anemia, unspecified    Hypotension    A-fib (CMS/HCC)    CAD (coronary artery disease)    HLD (hyperlipidemia)    Acute kidney injury superimposed on chronic kidney disease (CMS/[facility])        Plan:  Code Status: Full Code   Consult: Defer to attending per his request.  ATB: None noted at this time.   IVFs: Normal saline at 100 MLS per hour x 10 hours.  Meds: Protonix 40 mg IV 2 times daily, DuoNebs every 2 hours as needed for shortness of breath/wheezing, melatonin 3 milligrams p.o. daily as needed for insomnia, MiraLAX 17 gms p.o daily as needed for constipation.  Home meds resumed as appropriate.  [place]ARRS verified for tramadol.  Lasix, lisinopril, and allopurinol held due to kidney function.  Eliquis held due to drop in H/H.  Avoid nephrotoxic medications.  Diet:  N.p.o. after midnight.  Telemetry monitoring.  Vital signs with BP, HR, & RR Q 4 hours.  Pulse ox Q 4 hours.   Admission height and weight.  CPAP at at bedtime-May use home machine  Oxygen via nasal cannula at 1-4 L/min. Titrate for SPO2 92%.  Respiratory evaluation and treatment per protocol.  Labs ordered: CBC, BMP, magnesium, phosphorus, stool for occult blood, ferritin, folate, vitamin B12, iron and TIBC.  Test  ordered: Defer to attending.  Monitor / trend labs while inpatient.  Replace electrolytes as needed.  Transfuse with PRBC for hemoglobin<7.0.  Aspiration precautions.  Out of bed with assistance   Fall precautions  PT/OT eval and treat as indicated.  Call physician for temperature < 36.5 or >38.0 degrees celsius.  Call physician for pulse <50 or >120.  Call physician for respiratory rate <10 or >22.  Call physician for systolic blood pressure <90 or >170.  Call physician for diastolic blood pressure < 50 or >100.  Call physician for pulse ox <92%.  See orders for further plan of care ordered.     VTE prophylaxis:   Eliquis on hold for now due to drop in H/H  BLE SCDs.  Monitor for s/s of bleeding    Further evaluation and management per attending and consulting physicians.      This note has been transcribed using Dragon voice recognition system and there is a possibility of unintentional typing misprints.  Any information found to be copied from previous providers is done in the best interest of the patient to provide accurate, quality, and continuity of care.     I spent 45 minutes in the professional and overall care of this patient.      Brianna Boogie, APRN-CNP  [facility]

## 2024-02-28 NOTE — CONSULTS
Reason For Consult  anemia    History Of Present Illness  Reyes Cornelius is a 73 y.o. male with hx of CKDIII, Afib on apixaban, obesity, ANGELITO on CPAP, CAD s/p PTCA who presented from his PCP office due to symptomatic hypotension with BP of 60s/40s.    His BP has improved this morning to 90s/50s with aggressive volume replacement.     He states he was having darker stools but attributes this to constipation. He has no hematochezia. No abdominal pain. His last bowel movement was black and small in size with some heriberto. He had colonoscopy in 2016 for hematochezia attributed to resolved diverticular bleeding.     Review of labs shows Hg of 8.4 from 10 on admission. No known baseline though Hg was 14 in 2021.     On evaluation, he is resting comfortably. Using his CPAP. He is agreeable to endoscopy. He denies hx of NSAIDs. He does not take PPI OP.     Past Medical History  He has a past medical history of A-fib (CMS/[facility]) (02/28/2024), AAA (abdominal aortic aneurysm) (CMS/[facility]) (02/28/2024), Anemia, unspecified (02/27/2024), Aneurysm artery, iliac (CMS/[facility]) (02/28/2024), Back pain (02/28/2024), BPH (benign prostatic hyperplasia) (02/28/2024), CAD (coronary artery disease) (02/28/2024), CKD (chronic kidney disease), stage III (CMS/[facility]) (02/28/2024), Essential hypertension (02/28/2024), GI bleed (02/28/2024), Gout (02/28/2024), Hernia, abdominal, HLD (hyperlipidemia) (02/28/2024), CARMITA (iron deficiency anemia), Lymphedema (02/28/2024), Morbid obesity (CMS/[facility]) (02/28/2024), Obstructive sleep apnea, adult (02/28/2024), Osteoarthritis (02/28/2024), and Pulmonary hypertension (CMS/[facility]) (02/28/2024).    Surgical History  He has a past surgical history that includes Total knee arthroplasty (Bilateral); Coronary stent placement; IR angiogram endovascular aortic repair; Adenoidectomy; and Tonsillectomy.     Social History  He reports that he quit smoking about 15 years ago. His smoking use included cigarettes. He has quit using  "smokeless tobacco. He reports that he does not currently use alcohol. He reports that he does not use drugs.    Family History  Family History   Problem Relation Name Age of Onset    Cataracts Mother      Throat cancer Father      Cataracts Father      Cancer Brother      Heart disease Other      Glaucoma Other      Hypertension Other      Diabetes Other          Allergies  Iodinated contrast media, Tetracycline, Warfarin, and Xarelto [rivaroxaban]    Review of Systems  As documented in HPI     Physical Exam  Visit Vitals  BP 92/58 (BP Location: Right arm, Patient Position: Sitting)   Pulse 97   Temp 36.9 °C (98.4 °F)   Resp 18        GEN: in no acute distress  Head/Eyes: sclera anicteric  Lungs: normal work of breathing  CV: RRR   ABD: normal active bowel sounds, nontender, nondistended, nontympanitic, no hepatosplenomegaly, no palpable masses.  Skin: No rashes on extremities   Neuro: grossly non-focal.  Alert and oriented.  Psych: Appropriate affect      Last Recorded Vitals  Blood pressure 92/58, pulse 97, temperature 36.9 °C (98.4 °F), resp. rate 18, height 1.854 m (6' 1\"), weight 135 kg (297 lb 6.4 oz), SpO2 94 %.    Relevant Results    As documented in HPI     Assessment/Plan   Reyes Cornelius is a 73 y.o. male with hx of CKDIII, Afib on apixaban, obesity, ANGELITO on CPAP, CAD s/p PTCA who presented from his PCP office due to symptomatic hypotension with BP of 60s/40s. GI consulted for possible melena and anemia to 8.0 from prior baseline of 14 in 2021. Labs c/w CARMITA.     At this time it is unclear if his anemia is cause of hypotension, however given questionable melena and downtrend in Hg, we will plan on EGD to rule out any high risk lesion/AVMs/PUD, especially with hx of apixaban use. Note that his downtrend this admission  may be dilutional in nature as he has received a large volume of fluids). Last apixaban was on 02/26    Please make patient NPO at midnight  Plan on EGD tomorrow  Continue IV PPI BID  Continue " large bore Ivs        Giovanna Kaplan MD

## 2024-02-28 NOTE — NURSING NOTE
"0845: MD and NP at bedside and notified of pt. Orthostatic BP being positive at this time and pt symptomatic with dizziness and weakness. MD to order IV fluids.    1137: Pt. BP 76/43 manual HR 94. Pt. Reporting feeling dizzy at times with movement. MD notified at this time. NP also notified of occult stool resulting as positive. Hbg 8.4 on AM labs.    1200: Pt. Sitting in chair and now reporting to nursing increased weakness while at rest and reporting vision to be \"like I'm looking through a kaleidoscope and seeing black spots\" and feeling \"foggy\" and nauseous. Rapid response called at this time due to vision changes and sudden change in pt condition. Neuro exam and NIH completed by this RN with no noted deficits or weakness. Blood sugar 129 vitals 96/68 . 500cc bolus currently running. MD and RRT team at bedside to assess pt. EKG completed per MD request.    1235: pt. Reporting nausea and vision improving, Pt. States he is able to see clear now and the \"fogginess\" has resolved. Pt. Ordering lunch. PO fluid intake encouraged     1620: BP 74/48 at this time. Cont. IV fluids running per order. Pt. Laying in bed asymptomatic. Np notified. Bolus to be ordered    1805: Pt. BP 99/67 after 500cc bolus      EOS: pt. Blood pressures remained soft throughout this shift and responsive to fluid boluses. RRT called this shift as noted above. Pt. Orthostatic BP completed and reported to MD. Pt. Symptoms of dizziness and weakness waxing and waning with BP readings and ambulation. Pt. Reporting he feels \"much better\" at end of this shift compared to this afternoon. Vision changes, and nausea subsided and pt able to tolerate diet. Pt. Occult stool resulting positive this shift and this RN notified Np of result. Pt. To be NPO at midnight for EDG tomorrow. Pt. Tolerated IV boluses and IV fluids this shift. Pt. Remained on RA and neurologically intact and a&Ox3. Oral fluid intake encouraged throughout this shift. Pt. Family at " bedside and updated on pt. Status. Pt. Was able to ambulate to chair and bathroom this shift with staff assistance. Pt. Currently resting in bed at this time. Call light within reach. Bed alarm on. Bed in lowest position.

## 2024-02-28 NOTE — PROGRESS NOTES
Physical Therapy    Physical Therapy Evaluation    Patient Name: Reyes Cornelius  MRN: [id number]  Today's Date: 2/28/2024   Time Calculation  Start Time: 1040  Stop Time: 1055  Time Calculation (min): 15 min    Assessment/Plan   PT Assessment  PT Assessment Results: Decreased strength, Decreased endurance, Impaired balance, Decreased mobility  Rehab Prognosis: Good  Evaluation/Treatment Tolerance: Patient tolerated treatment well  Medical Staff Made Aware: Yes  End of Session Communication: Bedside nurse  Assessment Comment: Pt is a 73 y.o. male admitted for Hypotension, unspecified hypotension type [I95.9] on 2/27/2024. Pt below functional level and will benefit from skilled therapy during stay to improve overall functional mobility, strength, ROM, endurance and safety awareness. Upon discharge pt will benefit from low intensity therapy for continued improvement in functional mobility. Therapy will continue to follow and reassess each session.      End of Session Patient Position: Up in chair, Alarm on  IP OR SWING BED PT PLAN  Inpatient or Swing Bed: Inpatient  PT Plan  Treatment/Interventions: Bed mobility, Transfer training, Gait training, Stair training, Balance training, Therapeutic exercise, Therapeutic activity  PT Plan: Skilled PT  PT Frequency: 5 times per week  PT Discharge Recommendations: Low intensity level of continued care (depending on medical progression)  Equipment Recommended upon Discharge: Wheeled walker  PT Recommended Transfer Status: Assist x1  PT - OK to Discharge: Yes To next level of care when cleared by medical team.      Subjective     Current Problem:  Patient Active Problem List   Diagnosis    Weakness    Anemia, unspecified    Hypotension    A-fib (CMS/HCC)    AAA (abdominal aortic aneurysm) (CMS/HCC)    Aneurysm artery, iliac (CMS/HCC)    Back pain    BPH (benign prostatic hyperplasia)    CAD (coronary artery disease)    CKD (chronic kidney disease), stage III (CMS/HCC)    Essential  hypertension    GI bleed    Gout    HLD (hyperlipidemia)    Lymphedema    Morbid obesity (CMS/[facility])    Obstructive sleep apnea, adult    Osteoarthritis    Pulmonary hypertension (CMS/[facility])    Acute kidney injury superimposed on chronic kidney disease (CMS/[facility])       General Visit Information:  General  Reason for Referral: impaired mobility  Referred By: Dr. Frederick  Past Medical History Relevant to Rehab: atrial fibrillation on Eliquis coronary artery disease status post PTCA hypertension chronic kidney disease admitted to the hospital secondary to hypotension and dizziness patient was seen by his PCP in the office and his blood pressure was 60/40 and he was dizzy patient was transferred to the emergency room and given IV fluid continue to have dizziness patient recently following strict diet.  Family/Caregiver Present: No  Co-Treatment: OT  Co-Treatment Reason: patient safety  Prior to Session Communication: Bedside nurse  Patient Position Received: Up in chair, Alarm on  Preferred Learning Style: verbal, visual  General Comment: Pt agreeable to therapy    Home Living:  Home Living  Home Living Comments: Pt. lives with spouse in home with ramped entry with bedroom and bathroom on first floor with walk in shower has grab bars and shower chair. PLOF MOD I for ADLs and mobility with rollator or walking stick. Drives.    Prior Level of Function:  Prior Function Per Pt/Caregiver Report  Level of [place]: Independent with ADLs and functional transfers  ADL Assistance: Independent  Homemaking Assistance: Independent  Ambulatory Assistance: Independent    Precautions:  Precautions  Medical Precautions: Fall precautions  Precautions Comment: Orthostatic    Vital Signs:     Objective     Pain:  Pain Assessment  Pain Assessment: 0-10  Pain Score: 5 - Moderate pain  Pain Type: Chronic pain  Pain Location: Back    Cognition:  Cognition  Overall Cognitive Status: Within Functional Limits    General Assessments:       Activity Tolerance  Endurance: Endurance does not limit participation in activity  Sensation  Sensation Comment: Numbness to bilateral feet and hands              Static Sitting Balance  Static Sitting-Comment/Number of Minutes: good  Dynamic Sitting Balance  Dynamic Sitting-Comments: good  Static Standing Balance  Static Standing-Comment/Number of Minutes: good  Dynamic Standing Balance  Dynamic Standing-Comments: fair+    Functional Assessments:        Transfers  Transfer: Yes  Transfer 1  Technique 1: Sit to stand, Stand to sit  Transfer Level of Assistance 1: Close supervision  Trials/Comments 1: dizziness; BP dropped to 73/52 from 93/57             Extremity/Trunk Assessments:        RLE   RLE : Within Functional Limits  LLE   LLE : Within Functional Limits    Outcome Measures:  [facility] Basic Mobility  Turning from your back to your side while in a flat bed without using bedrails: A little  Moving from lying on your back to sitting on the side of a flat bed without using bedrails: A little  Moving to and from bed to chair (including a wheelchair): A little  Standing up from a chair using your arms (e.g. wheelchair or bedside chair): A little  To walk in hospital room: A little  Climbing 3-5 steps with railing: A little  Basic Mobility - Total Score: 18                            Goals:  Encounter Problems       Encounter Problems (Active)       PT Problem       Pt will demonstrate mod I for all bed mobility   (Progressing)       Start:  02/28/24    Expected End:  03/13/24            Pt will demonstrate mod I for all transfers with WW  (Progressing)       Start:  02/28/24    Expected End:  03/13/24            Pt will ambulate 150 ft with WW and mod I without hypotension.  (Progressing)       Start:  02/28/24    Expected End:  03/13/24            Pt will be able to negotiate 1 steps with LRD and mod I.  (Progressing)       Start:  02/28/24    Expected End:  03/13/24               Pain - Adult          Transfers        STG - Patient will perform toilet transfer MOD I (Progressing)       Start:  02/28/24    Expected End:  03/13/24                 Education Documentation  Precautions, taught by Emmy Goncalves, PT at 2/28/2024  5:02 PM.  Learner: Patient  Readiness: Acceptance  Method: Explanation  Response: Verbalizes Understanding, Needs Reinforcement    Body Mechanics, taught by Emmy Goncalves, PT at 2/28/2024  5:02 PM.  Learner: Patient  Readiness: Acceptance  Method: Explanation  Response: Verbalizes Understanding, Needs Reinforcement    Mobility Training, taught by Emmy Goncalves, PT at 2/28/2024  5:02 PM.  Learner: Patient  Readiness: Acceptance  Method: Explanation  Response: Verbalizes Understanding, Needs Reinforcement    Education Comments  No comments found.

## 2024-02-29 ENCOUNTER — ANESTHESIA (OUTPATIENT)
Dept: GASTROENTEROLOGY | Facility: HOSPITAL | Age: 74
DRG: 378 | End: 2024-02-29
Payer: MEDICARE

## 2024-02-29 ENCOUNTER — APPOINTMENT (OUTPATIENT)
Dept: GASTROENTEROLOGY | Facility: HOSPITAL | Age: 74
DRG: 378 | End: 2024-02-29
Payer: MEDICARE

## 2024-02-29 ENCOUNTER — ANESTHESIA EVENT (OUTPATIENT)
Dept: GASTROENTEROLOGY | Facility: HOSPITAL | Age: 74
DRG: 378 | End: 2024-02-29
Payer: MEDICARE

## 2024-02-29 LAB
ANION GAP SERPL CALC-SCNC: 12 MMOL/L (ref 10–20)
BUN SERPL-MCNC: 66 MG/DL (ref 6–23)
CALCIUM SERPL-MCNC: 8.8 MG/DL (ref 8.6–10.3)
CHLORIDE SERPL-SCNC: 113 MMOL/L (ref 98–107)
CO2 SERPL-SCNC: 17 MMOL/L (ref 21–32)
CREAT SERPL-MCNC: 1.67 MG/DL (ref 0.5–1.3)
EGFRCR SERPLBLD CKD-EPI 2021: 43 ML/MIN/1.73M*2
ERYTHROCYTE [DISTWIDTH] IN BLOOD BY AUTOMATED COUNT: 16.7 % (ref 11.5–14.5)
GLUCOSE SERPL-MCNC: 90 MG/DL (ref 74–99)
HCT VFR BLD AUTO: 28.7 % (ref 41–52)
HGB BLD-MCNC: 8.9 G/DL (ref 13.5–17.5)
MAGNESIUM SERPL-MCNC: 2.25 MG/DL (ref 1.6–2.4)
MCH RBC QN AUTO: 29.4 PG (ref 26–34)
MCHC RBC AUTO-ENTMCNC: 31 G/DL (ref 32–36)
MCV RBC AUTO: 95 FL (ref 80–100)
NRBC BLD-RTO: 0 /100 WBCS (ref 0–0)
PLATELET # BLD AUTO: 200 X10*3/UL (ref 150–450)
POTASSIUM SERPL-SCNC: 4.8 MMOL/L (ref 3.5–5.3)
RBC # BLD AUTO: 3.03 X10*6/UL (ref 4.5–5.9)
SODIUM SERPL-SCNC: 137 MMOL/L (ref 136–145)
WBC # BLD AUTO: 5.9 X10*3/UL (ref 4.4–11.3)

## 2024-02-29 PROCEDURE — A43239 PR EDG TRANSORAL BIOPSY SINGLE/MULTIPLE: Performed by: STUDENT IN AN ORGANIZED HEALTH CARE EDUCATION/TRAINING PROGRAM

## 2024-02-29 PROCEDURE — 43255 EGD CONTROL BLEEDING ANY: CPT | Performed by: INTERNAL MEDICINE

## 2024-02-29 PROCEDURE — 99100 ANES PT EXTEME AGE<1 YR&>70: CPT | Performed by: STUDENT IN AN ORGANIZED HEALTH CARE EDUCATION/TRAINING PROGRAM

## 2024-02-29 PROCEDURE — 88305 TISSUE EXAM BY PATHOLOGIST: CPT | Mod: TC,STJLAB | Performed by: INTERNAL MEDICINE

## 2024-02-29 PROCEDURE — 2500000001 HC RX 250 WO HCPCS SELF ADMINISTERED DRUGS (ALT 637 FOR MEDICARE OP): Performed by: INTERNAL MEDICINE

## 2024-02-29 PROCEDURE — 43239 EGD BIOPSY SINGLE/MULTIPLE: CPT | Performed by: INTERNAL MEDICINE

## 2024-02-29 PROCEDURE — 1200000002 HC GENERAL ROOM WITH TELEMETRY DAILY

## 2024-02-29 PROCEDURE — 2500000004 HC RX 250 GENERAL PHARMACY W/ HCPCS (ALT 636 FOR OP/ED): Performed by: NURSE PRACTITIONER

## 2024-02-29 PROCEDURE — 2500000004 HC RX 250 GENERAL PHARMACY W/ HCPCS (ALT 636 FOR OP/ED): Performed by: NURSE ANESTHETIST, CERTIFIED REGISTERED

## 2024-02-29 PROCEDURE — 7100000002 HC RECOVERY ROOM TIME - EACH INCREMENTAL 1 MINUTE: Performed by: INTERNAL MEDICINE

## 2024-02-29 PROCEDURE — 36415 COLL VENOUS BLD VENIPUNCTURE: CPT | Performed by: NURSE PRACTITIONER

## 2024-02-29 PROCEDURE — A43239 PR EDG TRANSORAL BIOPSY SINGLE/MULTIPLE: Performed by: NURSE ANESTHETIST, CERTIFIED REGISTERED

## 2024-02-29 PROCEDURE — 3700000001 HC GENERAL ANESTHESIA TIME - INITIAL BASE CHARGE: Performed by: INTERNAL MEDICINE

## 2024-02-29 PROCEDURE — 0W3P8ZZ CONTROL BLEEDING IN GASTROINTESTINAL TRACT, VIA NATURAL OR ARTIFICIAL OPENING ENDOSCOPIC: ICD-10-PCS | Performed by: STUDENT IN AN ORGANIZED HEALTH CARE EDUCATION/TRAINING PROGRAM

## 2024-02-29 PROCEDURE — 7100000001 HC RECOVERY ROOM TIME - INITIAL BASE CHARGE: Performed by: INTERNAL MEDICINE

## 2024-02-29 PROCEDURE — 3700000002 HC GENERAL ANESTHESIA TIME - EACH INCREMENTAL 1 MINUTE: Performed by: INTERNAL MEDICINE

## 2024-02-29 PROCEDURE — 2720000007 HC OR 272 NO HCPCS: Performed by: INTERNAL MEDICINE

## 2024-02-29 PROCEDURE — 85027 COMPLETE CBC AUTOMATED: CPT | Performed by: NURSE PRACTITIONER

## 2024-02-29 PROCEDURE — 0DB68ZX EXCISION OF STOMACH, VIA NATURAL OR ARTIFICIAL OPENING ENDOSCOPIC, DIAGNOSTIC: ICD-10-PCS | Performed by: STUDENT IN AN ORGANIZED HEALTH CARE EDUCATION/TRAINING PROGRAM

## 2024-02-29 PROCEDURE — 2500000004 HC RX 250 GENERAL PHARMACY W/ HCPCS (ALT 636 FOR OP/ED)

## 2024-02-29 PROCEDURE — 88305 TISSUE EXAM BY PATHOLOGIST: CPT

## 2024-02-29 PROCEDURE — 2500000001 HC RX 250 WO HCPCS SELF ADMINISTERED DRUGS (ALT 637 FOR MEDICARE OP): Performed by: NURSE PRACTITIONER

## 2024-02-29 PROCEDURE — 97116 GAIT TRAINING THERAPY: CPT | Mod: GP,CQ

## 2024-02-29 PROCEDURE — 80048 BASIC METABOLIC PNL TOTAL CA: CPT | Performed by: NURSE PRACTITIONER

## 2024-02-29 PROCEDURE — 97110 THERAPEUTIC EXERCISES: CPT | Mod: GP,CQ

## 2024-02-29 PROCEDURE — 0DB78ZX EXCISION OF STOMACH, PYLORUS, VIA NATURAL OR ARTIFICIAL OPENING ENDOSCOPIC, DIAGNOSTIC: ICD-10-PCS | Performed by: STUDENT IN AN ORGANIZED HEALTH CARE EDUCATION/TRAINING PROGRAM

## 2024-02-29 PROCEDURE — A4216 STERILE WATER/SALINE, 10 ML: HCPCS

## 2024-02-29 PROCEDURE — C9113 INJ PANTOPRAZOLE SODIUM, VIA: HCPCS | Performed by: NURSE PRACTITIONER

## 2024-02-29 PROCEDURE — 83735 ASSAY OF MAGNESIUM: CPT | Performed by: NURSE PRACTITIONER

## 2024-02-29 RX ORDER — DEXTROMETHORPHAN/PSEUDOEPHED 2.5-7.5/.8
DROPS ORAL AS NEEDED
Status: COMPLETED | OUTPATIENT
Start: 2024-02-29 | End: 2024-02-29

## 2024-02-29 RX ORDER — SODIUM CHLORIDE 9 MG/ML
INJECTION, SOLUTION INTRAMUSCULAR; INTRAVENOUS; SUBCUTANEOUS
Status: COMPLETED
Start: 2024-02-29 | End: 2024-02-29

## 2024-02-29 RX ORDER — ONDANSETRON HYDROCHLORIDE 2 MG/ML
INJECTION, SOLUTION INTRAVENOUS AS NEEDED
Status: DISCONTINUED | OUTPATIENT
Start: 2024-02-29 | End: 2024-02-29

## 2024-02-29 RX ORDER — PROPOFOL 10 MG/ML
INJECTION, EMULSION INTRAVENOUS AS NEEDED
Status: DISCONTINUED | OUTPATIENT
Start: 2024-02-29 | End: 2024-02-29

## 2024-02-29 RX ORDER — SODIUM CHLORIDE, SODIUM LACTATE, POTASSIUM CHLORIDE, CALCIUM CHLORIDE 600; 310; 30; 20 MG/100ML; MG/100ML; MG/100ML; MG/100ML
INJECTION, SOLUTION INTRAVENOUS CONTINUOUS PRN
Status: DISCONTINUED | OUTPATIENT
Start: 2024-02-29 | End: 2024-02-29

## 2024-02-29 RX ADMIN — EPLERENONE 25 MG: 25 TABLET, FILM COATED ORAL at 09:24

## 2024-02-29 RX ADMIN — SODIUM CHLORIDE, POTASSIUM CHLORIDE, SODIUM LACTATE AND CALCIUM CHLORIDE: 600; 310; 30; 20 INJECTION, SOLUTION INTRAVENOUS at 10:38

## 2024-02-29 RX ADMIN — DOCUSATE SODIUM 100 MG: 100 CAPSULE, LIQUID FILLED ORAL at 21:47

## 2024-02-29 RX ADMIN — SIMETHICONE 333 MG: 20 EMULSION ORAL at 10:48

## 2024-02-29 RX ADMIN — FINASTERIDE 5 MG: 5 TABLET, FILM COATED ORAL at 09:24

## 2024-02-29 RX ADMIN — Medication 1000 UNITS: at 09:24

## 2024-02-29 RX ADMIN — METOPROLOL SUCCINATE 25 MG: 25 TABLET, EXTENDED RELEASE ORAL at 09:24

## 2024-02-29 RX ADMIN — PANTOPRAZOLE SODIUM 40 MG: 40 INJECTION, POWDER, FOR SOLUTION INTRAVENOUS at 21:07

## 2024-02-29 RX ADMIN — PROPOFOL 50 MG: 10 INJECTION, EMULSION INTRAVENOUS at 10:49

## 2024-02-29 RX ADMIN — TRAMADOL HYDROCHLORIDE 50 MG: 50 TABLET, COATED ORAL at 05:26

## 2024-02-29 RX ADMIN — ONDANSETRON 4 MG: 2 INJECTION, SOLUTION INTRAMUSCULAR; INTRAVENOUS at 10:51

## 2024-02-29 RX ADMIN — PANTOPRAZOLE SODIUM 40 MG: 40 INJECTION, POWDER, FOR SOLUTION INTRAVENOUS at 09:24

## 2024-02-29 RX ADMIN — PROPOFOL 40 MG: 10 INJECTION, EMULSION INTRAVENOUS at 10:53

## 2024-02-29 RX ADMIN — Medication 1 TABLET: at 09:24

## 2024-02-29 RX ADMIN — PROPOFOL 30 MG: 10 INJECTION, EMULSION INTRAVENOUS at 10:41

## 2024-02-29 RX ADMIN — SODIUM CHLORIDE: 9 INJECTION INTRAMUSCULAR; INTRAVENOUS; SUBCUTANEOUS at 21:15

## 2024-02-29 RX ADMIN — PROPOFOL 80 MG: 10 INJECTION, EMULSION INTRAVENOUS at 10:38

## 2024-02-29 RX ADMIN — ATORVASTATIN CALCIUM 80 MG: 80 TABLET, FILM COATED ORAL at 21:47

## 2024-02-29 ASSESSMENT — COGNITIVE AND FUNCTIONAL STATUS - GENERAL
PERSONAL GROOMING: A LITTLE
MOBILITY SCORE: 18
STANDING UP FROM CHAIR USING ARMS: A LITTLE
DAILY ACTIVITIY SCORE: 19
CLIMB 3 TO 5 STEPS WITH RAILING: A LITTLE
STANDING UP FROM CHAIR USING ARMS: A LITTLE
TURNING FROM BACK TO SIDE WHILE IN FLAT BAD: A LITTLE
MOVING FROM LYING ON BACK TO SITTING ON SIDE OF FLAT BED WITH BEDRAILS: A LITTLE
STANDING UP FROM CHAIR USING ARMS: A LITTLE
MOVING TO AND FROM BED TO CHAIR: A LITTLE
MOBILITY SCORE: 18
PERSONAL GROOMING: A LITTLE
CLIMB 3 TO 5 STEPS WITH RAILING: A LITTLE
WALKING IN HOSPITAL ROOM: A LITTLE
DAILY ACTIVITIY SCORE: 19
HELP NEEDED FOR BATHING: A LITTLE
TOILETING: A LITTLE
DRESSING REGULAR LOWER BODY CLOTHING: A LITTLE
MOVING TO AND FROM BED TO CHAIR: A LITTLE
WALKING IN HOSPITAL ROOM: A LITTLE
TURNING FROM BACK TO SIDE WHILE IN FLAT BAD: A LITTLE
DRESSING REGULAR UPPER BODY CLOTHING: A LITTLE
HELP NEEDED FOR BATHING: A LITTLE
MOVING TO AND FROM BED TO CHAIR: A LITTLE
MOBILITY SCORE: 18
TURNING FROM BACK TO SIDE WHILE IN FLAT BAD: A LITTLE
DRESSING REGULAR UPPER BODY CLOTHING: A LITTLE
TOILETING: A LITTLE
MOVING FROM LYING ON BACK TO SITTING ON SIDE OF FLAT BED WITH BEDRAILS: A LITTLE
MOVING FROM LYING ON BACK TO SITTING ON SIDE OF FLAT BED WITH BEDRAILS: A LITTLE
WALKING IN HOSPITAL ROOM: A LITTLE
DRESSING REGULAR LOWER BODY CLOTHING: A LITTLE
CLIMB 3 TO 5 STEPS WITH RAILING: A LITTLE

## 2024-02-29 ASSESSMENT — PAIN SCALES - GENERAL
PAINLEVEL_OUTOF10: 0 - NO PAIN
PAINLEVEL_OUTOF10: 0 - NO PAIN
PAINLEVEL_OUTOF10: 5 - MODERATE PAIN
PAINLEVEL_OUTOF10: 7
PAINLEVEL_OUTOF10: 0 - NO PAIN

## 2024-02-29 ASSESSMENT — PAIN - FUNCTIONAL ASSESSMENT
PAIN_FUNCTIONAL_ASSESSMENT: 0-10

## 2024-02-29 ASSESSMENT — PAIN DESCRIPTION - DESCRIPTORS: DESCRIPTORS: ACHING

## 2024-02-29 ASSESSMENT — PAIN DESCRIPTION - LOCATION: LOCATION: GENERALIZED

## 2024-02-29 NOTE — PROGRESS NOTES
Attempted to meet with patient at the bedside to discuss HC. In bathroom at this time. Will reattempt.   0930 Attempted to meet with patient at the bedside again-- RN passing meds. Will reattempt.   1020 Attempted to meet with patient at the bedside again and he is out of room for testing.   1115 Patient in endoscopy--will try again to see patient this afternoon.     1430 Met with patient and his wife at the bedside. [facility]--[facility] declines. States he does not feel it is very helpful and he knows what exercises to do at home. States he would like [facility] at the [facility], Dr Mabry notified and medical info sent there.   [facility] at [phone]-spoke with Bri--transferred me to [facility] nurse (Halina)--left  and requested call back including my call back information.   1600 Release of medical information form started and placed on front of chart-patient currently sleeping soundly.     Eileen Sheriff, RN

## 2024-02-29 NOTE — PROGRESS NOTES
Physical Therapy    Physical Therapy Treatment    Patient Name: Reyes Cornelius  MRN: [id number]  Today's Date: 2/29/2024  Time Calculation  Start Time: 0828  Stop Time: 0858  Time Calculation (min): 30 min       Assessment/Plan   PT Assessment  PT Assessment Results: Decreased strength, Decreased endurance, Impaired balance, Decreased mobility  Rehab Prognosis: Good  Evaluation/Treatment Tolerance: Patient tolerated treatment well  End of Session Communication: Bedside nurse  Assessment Comment: patient tolerated session well.  no dizziness reported with transfers and gait.  good performance of therex despite increased pain in bilateral LE's  End of Session Patient Position: Up in chair, Alarm on  PT Plan  Inpatient/Swing Bed or Outpatient: Inpatient  PT Plan  Treatment/Interventions: Bed mobility, Transfer training, Gait training, Therapeutic exercise  PT Plan: Skilled PT  PT Frequency: 5 times per week  PT Discharge Recommendations: Low intensity level of continued care  Equipment Recommended upon Discharge: Wheeled walker  PT Recommended Transfer Status: Contact guard  PT - OK to Discharge: Yes     02/29/24 0828   PT  Visit   PT Received On 02/29/24   Response to Previous Treatment Patient with no complaints from previous session.   General   Prior to Session Communication Bedside nurse   Patient Position Received Bed, 3 rail up;Alarm on   Preferred Learning Style verbal;visual   Precautions   Medical Precautions Fall precautions   Precautions Comment orthostatic +   Vital Signs   Heart Rate 108   Heart Rate Source Monitor   Pain Assessment   Pain Assessment 0-10   Pain Score 5 - Moderate pain   Pain Type Chronic pain   Pain Location Leg   Pain Orientation Right;Left   Pain Descriptors Aching   Static Sitting Balance   Static Sitting-Comment/Number of Minutes good   Dynamic Sitting Balance   Dynamic Sitting-Balance Support No upper extremity supported   Dynamic Sitting-Balance   (seated therex)   Dynamic  Sitting-Comments good   Static Standing Balance   Static Standing-Comment/Number of Minutes good   Therapeutic Exercise   Therapeutic Exercise Performed Yes   Therapeutic Exercise Activity 1 PF/DF x15   Therapeutic Exercise Activity 2 LAQ 2x10   Therapeutic Exercise Activity 3 marches x15   Therapeutic Exercise Activity 4 pillow squeeze x15   Therapeutic Exercise Activity 5 resisted hip ABD x15   Bed Mobility   Bed Mobility Yes   Bed Mobility 1   Bed Mobility 1 Supine to sitting   Level of Assistance 1 Close supervision   Ambulation/Gait Training   Ambulation/Gait Training Performed Yes   Ambulation/Gait Training 1   Surface 1 Level tile   Device 1 Rolling walker   Gait Support Devices Gait belt   Assistance 1 Contact guard   Quality of Gait 1 WBOS;Decreased step length   Comments/Distance (ft) 1 50' x2   Transfers   Transfer Yes   Transfer 1   Transfer From 1 Bed to   Transfer to 1 Stand   Technique 1 Sit to stand   Transfer Device 1 Walker;Gait belt   Transfer Level of Assistance 1 Close supervision   Trials/Comments 1 no dizziness  reported   Transfers 2   Transfer From 2 Stand to   Transfer to 2 Chair with arms   Technique 2 Stand to sit   Transfer Device 2 Walker   Transfer Level of Assistance 2 Close supervision   Transfers 3   Transfer From 3 Stand to   Transfer to 3 Toilet   Technique 3 Stand to sit   Transfer Device 3 Walker   Transfer Level of Assistance 3 Contact guard   Activity Tolerance   Endurance Endurance does not limit participation in activity   PT Assessment   PT Assessment Results Decreased strength;Decreased endurance;Impaired balance;Decreased mobility   Rehab Prognosis Good   Evaluation/Treatment Tolerance Patient tolerated treatment well   End of Session Communication Bedside nurse   Assessment Comment patient tolerated session well.  no dizziness reported with transfers and gait.  good performance of therex despite increased pain in bilateral LE's   End of Session Patient Position Up in  chair;Alarm on   Outpatient Education   Individual(s) Educated Patient   Education Provided Fall Risk   PT Plan   Inpatient/Swing Bed or Outpatient Inpatient   PT Plan   Treatment/Interventions Bed mobility;Transfer training;Gait training;Therapeutic exercise   PT Plan Skilled PT   PT Frequency 5 times per week   PT Discharge Recommendations Low intensity level of continued care   Equipment Recommended upon Discharge Wheeled walker   PT Recommended Transfer Status Contact guard     Outcome Measures:  [facility] Basic Mobility  Turning from your back to your side while in a flat bed without using bedrails: A little  Moving from lying on your back to sitting on the side of a flat bed without using bedrails: A little  Moving to and from bed to chair (including a wheelchair): A little  Standing up from a chair using your arms (e.g. wheelchair or bedside chair): A little  To walk in hospital room: A little  Climbing 3-5 steps with railing: A little  Basic Mobility - Total Score: 18        EDUCATION:  Outpatient Education  Individual(s) Educated: Patient  Education Provided: Fall Risk  Education Documentation  Body Mechanics, taught by Jose L Salgado PTA at 2/29/2024  9:05 AM.  Learner: Patient  Readiness: Acceptance  Method: Explanation  Response: Verbalizes Understanding, Demonstrated Understanding    Mobility Training, taught by Jose L Salgado PTA at 2/29/2024  9:05 AM.  Learner: Patient  Readiness: Acceptance  Method: Explanation  Response: Verbalizes Understanding, Demonstrated Understanding    Education Comments  No comments found.        GOALS:  Encounter Problems       Encounter Problems (Active)       PT Problem       Pt will demonstrate mod I for all bed mobility   (Progressing)       Start:  02/28/24    Expected End:  03/13/24            Pt will demonstrate mod I for all transfers with WW  (Progressing)       Start:  02/28/24    Expected End:  03/13/24            Pt will ambulate 150 ft with WW and mod I without  hypotension.  (Progressing)       Start:  02/28/24    Expected End:  03/13/24            Pt will be able to negotiate 1 steps with LRD and mod I.  (Progressing)       Start:  02/28/24    Expected End:  03/13/24               Pain - Adult          Transfers       STG - Patient will perform toilet transfer MOD I (Progressing)       Start:  02/28/24    Expected End:  03/13/24

## 2024-02-29 NOTE — PROGRESS NOTES
"Subjective  Patient had uneventful night does not complain about any chest pain shortness of breath doing better continue to be orthostatic  Nursing staff was interviewed  Objectives    Last Recorded Vitals  Blood pressure 76/58, pulse 104, temperature 36.8 °C (98.2 °F), temperature source Temporal, resp. rate 18, height 1.854 m (6' 1\"), weight 135 kg (297 lb 6.4 oz), SpO2 95 %.    Physical Exam  HENT:      Right Ear: External ear normal.      Left Ear: External ear normal.      Mouth/Throat:      Mouth: Mucous membranes are moist.   Cardiovascular:      Rate and Rhythm: Normal rate and regular rhythm.      Heart sounds: No murmur heard.     No friction rub. No gallop.   Pulmonary:      Effort: No accessory muscle usage or respiratory distress.      Breath sounds: No stridor. No wheezing or rhonchi.   Chest:      Chest wall: No tenderness.   Abdominal:      General: There is no distension.      Palpations: There is no mass.      Tenderness: There is no abdominal tenderness. There is no guarding or rebound.   Musculoskeletal:         General: No deformity or signs of injury.      Cervical back: No rigidity or tenderness. Normal range of motion.      Positive edema over lower extremity bilateral.   Skin:     Coloration: Skin is not jaundiced or pale.      Findings: No lesion.   Neurological:      General: No focal deficit present.      Mental Status: He is alert, oriented to person, place, and time and easily aroused.      Cranial Nerves: No cranial nerve deficit.      Sensory: No sensory deficit.      Motor: No weakness.        Labs    Admission on 02/27/2024   Component Date Value Ref Range Status    Ventricular Rate 02/27/2024 85  BPM Preliminary    Atrial Rate 02/27/2024 312  BPM Preliminary    QRS Duration 02/27/2024 90  ms Preliminary    QT Interval 02/27/2024 380  ms Preliminary    QTC Calculation(Bazett) 02/27/2024 452  ms Preliminary    R Axis 02/27/2024 -54  degrees Preliminary    T Axis 02/27/2024 31  " degrees Preliminary    QRS Count 02/27/2024 13  beats Preliminary    Q Onset 02/27/2024 211  ms Preliminary    T Offset 02/27/2024 401  ms Preliminary    QTC Fredericia 02/27/2024 426  ms Preliminary    WBC 02/27/2024 6.0  4.4 - 11.3 x10*3/uL Final    nRBC 02/27/2024 0.0  0.0 - 0.0 /100 WBCs Final    RBC 02/27/2024 3.47 (L)  4.50 - 5.90 x10*6/uL Final    Hemoglobin 02/27/2024 10.0 (L)  13.5 - 17.5 g/dL Final    Hematocrit 02/27/2024 32.9 (L)  41.0 - 52.0 % Final    MCV 02/27/2024 95  80 - 100 fL Final    MCH 02/27/2024 28.8  26.0 - 34.0 pg Final    MCHC 02/27/2024 30.4 (L)  32.0 - 36.0 g/dL Final    RDW 02/27/2024 16.2 (H)  11.5 - 14.5 % Final    Platelets 02/27/2024 229  150 - 450 x10*3/uL Final    Neutrophils % 02/27/2024 72.3  40.0 - 80.0 % Final    Immature Granulocytes %, Automated 02/27/2024 0.2  0.0 - 0.9 % Final    Immature Granulocyte Count (IG) includes promyelocytes, myelocytes and metamyelocytes but does not include bands. Percent differential counts (%) should be interpreted in the context of the absolute cell counts (cells/UL).    Lymphocytes % 02/27/2024 12.8  13.0 - 44.0 % Final    Monocytes % 02/27/2024 10.5  2.0 - 10.0 % Final    Eosinophils % 02/27/2024 4.0  0.0 - 6.0 % Final    Basophils % 02/27/2024 0.2  0.0 - 2.0 % Final    Neutrophils Absolute 02/27/2024 4.34  1.60 - 5.50 x10*3/uL Final    Percent differential counts (%) should be interpreted in the context of the absolute cell counts (cells/uL).    Immature Granulocytes Absolute, Au* 02/27/2024 0.01  0.00 - 0.50 x10*3/uL Final    Lymphocytes Absolute 02/27/2024 0.77 (L)  0.80 - 3.00 x10*3/uL Final    Monocytes Absolute 02/27/2024 0.63  0.05 - 0.80 x10*3/uL Final    Eosinophils Absolute 02/27/2024 0.24  0.00 - 0.40 x10*3/uL Final    Basophils Absolute 02/27/2024 0.01  0.00 - 0.10 x10*3/uL Final    Glucose 02/27/2024 100 (H)  74 - 99 mg/dL Final    Sodium 02/27/2024 130 (L)  136 - 145 mmol/L Final    Potassium 02/27/2024 5.3  3.5 - 5.3 mmol/L  Final    Chloride 02/27/2024 102  98 - 107 mmol/L Final    Bicarbonate 02/27/2024 21  21 - 32 mmol/L Final    Anion Gap 02/27/2024 12  10 - 20 mmol/L Final    Urea Nitrogen 02/27/2024 89 (H)  6 - 23 mg/dL Final    Creatinine 02/27/2024 2.10 (H)  0.50 - 1.30 mg/dL Final    eGFR 02/27/2024 32 (L)  >60 mL/min/1.73m*2 Final    Calculations of estimated GFR are performed using the 2021 CKD-EPI Study Refit equation without the race variable for the IDMS-Traceable creatinine methods.  https://jasn.asnjournals.org/content/early/2021/09/22/ASN.0015626436    Calcium 02/27/2024 9.1  8.6 - 10.3 mg/dL Final    Albumin 02/27/2024 3.6  3.4 - 5.0 g/dL Final    Alkaline Phosphatase 02/27/2024 90  33 - 136 U/L Final    Total Protein 02/27/2024 6.3 (L)  6.4 - 8.2 g/dL Final    AST 02/27/2024 9  9 - 39 U/L Final    Bilirubin, Total 02/27/2024 0.6  0.0 - 1.2 mg/dL Final    ALT 02/27/2024 11  10 - 52 U/L Final    Patients treated with Sulfasalazine may generate falsely decreased results for ALT.    Magnesium 02/27/2024 2.31  1.60 - 2.40 mg/dL Final    Phosphorus 02/27/2024 4.0  2.5 - 4.9 mg/dL Final    The performance characteristics of phosphorus testing in heparinized plasma have been validated by the individual  laboratory site where testing is performed. Testing on heparinized plasma is not approved by the FDA; however, such approval is not necessary.    Color, Urine 02/27/2024 Yellow  Straw, Yellow Final    Appearance, Urine 02/27/2024 Clear  Clear Final    Specific Gravity, Urine 02/27/2024 1.011  1.005 - 1.035 Final    pH, Urine 02/27/2024 5.0  5.0, 5.5, 6.0, 6.5, 7.0, 7.5, 8.0 Final    Protein, Urine 02/27/2024 NEGATIVE  NEGATIVE mg/dL Final    Glucose, Urine 02/27/2024 50 (1+) (A)  NEGATIVE mg/dL Final    Blood, Urine 02/27/2024 NEGATIVE  NEGATIVE Final    Ketones, Urine 02/27/2024 NEGATIVE  NEGATIVE mg/dL Final    Bilirubin, Urine 02/27/2024 NEGATIVE  NEGATIVE Final    Urobilinogen, Urine 02/27/2024 <2.0  <2.0 mg/dL Final     Nitrite, Urine 02/27/2024 NEGATIVE  NEGATIVE Final    Leukocyte Esterase, Urine 02/27/2024 NEGATIVE  NEGATIVE Final    Extra Tube 02/27/2024 Hold for add-ons.   Final    Auto resulted.    Troponin I, High Sensitivity 02/27/2024 7  0 - 20 ng/L Final    Troponin I, High Sensitivity 02/27/2024 7  0 - 20 ng/L Final    Extra Tube 02/27/2024 Hold for add-ons.   Final    Auto resulted.    Extra Tube 02/27/2024 Hold for add-ons.   Final    Auto resulted.    ABO TYPE 02/27/2024 A   Final    Rh TYPE 02/27/2024 POS   Final    ANTIBODY SCREEN 02/27/2024 NEG   Final    Occult Blood, Stool X1 02/28/2024 Positive (A)  Negative Final    WBC 02/27/2024 5.1  4.4 - 11.3 x10*3/uL Final    nRBC 02/27/2024 0.0  0.0 - 0.0 /100 WBCs Final    RBC 02/27/2024 3.08 (L)  4.50 - 5.90 x10*6/uL Final    Hemoglobin 02/27/2024 9.0 (L)  13.5 - 17.5 g/dL Final    Hematocrit 02/27/2024 29.5 (L)  41.0 - 52.0 % Final    MCV 02/27/2024 96  80 - 100 fL Final    MCH 02/27/2024 29.2  26.0 - 34.0 pg Final    MCHC 02/27/2024 30.5 (L)  32.0 - 36.0 g/dL Final    RDW 02/27/2024 16.2 (H)  11.5 - 14.5 % Final    Platelets 02/27/2024 197  150 - 450 x10*3/uL Final    Magnesium 02/28/2024 2.32  1.60 - 2.40 mg/dL Final    WBC 02/28/2024 4.8  4.4 - 11.3 x10*3/uL Final    nRBC 02/28/2024 0.0  0.0 - 0.0 /100 WBCs Final    RBC 02/28/2024 2.98 (L)  4.50 - 5.90 x10*6/uL Final    Hemoglobin 02/28/2024 8.4 (L)  13.5 - 17.5 g/dL Final    Hematocrit 02/28/2024 28.7 (L)  41.0 - 52.0 % Final    MCV 02/28/2024 96  80 - 100 fL Final    MCH 02/28/2024 28.2  26.0 - 34.0 pg Final    MCHC 02/28/2024 29.3 (L)  32.0 - 36.0 g/dL Final    RDW 02/28/2024 16.3 (H)  11.5 - 14.5 % Final    Platelets 02/28/2024 191  150 - 450 x10*3/uL Final    Glucose 02/28/2024 82  74 - 99 mg/dL Final    Sodium 02/28/2024 134 (L)  136 - 145 mmol/L Final    Potassium 02/28/2024 5.2  3.5 - 5.3 mmol/L Final    Chloride 02/28/2024 108 (H)  98 - 107 mmol/L Final    Bicarbonate 02/28/2024 18 (L)  21 - 32 mmol/L Final     Anion Gap 02/28/2024 13  10 - 20 mmol/L Final    Urea Nitrogen 02/28/2024 81 (H)  6 - 23 mg/dL Final    Creatinine 02/28/2024 1.80 (H)  0.50 - 1.30 mg/dL Final    eGFR 02/28/2024 39 (L)  >60 mL/min/1.73m*2 Final    Calculations of estimated GFR are performed using the 2021 CKD-EPI Study Refit equation without the race variable for the IDMS-Traceable creatinine methods.  https://jasn.asnjournals.org/content/early/2021/09/22/ASN.0275339391    Calcium 02/28/2024 8.6  8.6 - 10.3 mg/dL Final    Phosphorus 02/28/2024 3.6  2.5 - 4.9 mg/dL Final    The performance characteristics of phosphorus testing in heparinized plasma have been validated by the individual  laboratory site where testing is performed. Testing on heparinized plasma is not approved by the FDA; however, such approval is not necessary.    Ferritin 02/28/2024 22  20 - 300 ng/mL Final    Folate, Serum 02/28/2024 8.8  >5.0 ng/mL Final    Iron 02/28/2024 20 (L)  35 - 150 ug/dL Final    UIBC 02/28/2024 234  110 - 370 ug/dL Final    TIBC 02/28/2024 254  240 - 445 ug/dL Final    % Saturation 02/28/2024 8 (L)  25 - 45 % Final    Vitamin B12 02/28/2024 868  211 - 911 pg/mL Final    POCT Glucose 02/28/2024 129 (H)  74 - 99 mg/dL Final    Protime 02/28/2024 16.8 (H)  9.8 - 12.8 seconds Final    INR 02/28/2024 1.5 (H)  0.9 - 1.1 Final    BNP 02/28/2024 210 (H)  0 - 99 pg/mL Final         Imaging     ECG 12 lead  Atrial fibrillation with premature ventricular or aberrantly conducted complexes  Left axis deviation  Low voltage QRS  Inferior infarct (cited on or before 30-OCT-2016)  Cannot rule out Anterior infarct (cited on or before 30-OCT-2016)  Abnormal ECG  When compared with ECG of 30-OCT-2016 13:41,  Nonspecific T wave abnormality no longer evident in Lateral leads       Patient Active Problem List   Diagnosis    Weakness    Anemia, unspecified    Hypotension    A-fib (CMS/HCC)    AAA (abdominal aortic aneurysm) (CMS/HCC)    Aneurysm artery, iliac (CMS/HCC)     Back pain    BPH (benign prostatic hyperplasia)    CAD (coronary artery disease)    CKD (chronic kidney disease), stage III (CMS/HCC)    Essential hypertension    GI bleed    Gout    HLD (hyperlipidemia)    Lymphedema    Morbid obesity (CMS/HCC)    Obstructive sleep apnea, adult    Osteoarthritis    Pulmonary hypertension (CMS/HCC)    Acute kidney injury superimposed on chronic kidney disease (CMS/HCC)         Assessment/Plan   Principal Problem:    Weakness  Active Problems:    Anemia, unspecified    Hypotension    A-fib (CMS/HCC)    CAD (coronary artery disease)    HLD (hyperlipidemia)    Acute kidney injury superimposed on chronic kidney disease (CMS/HCC)      Orthostatic hypotension apply compression stockings to the lower extremity to reduce swelling and to improve his orthostatic hypotension  Positive occult blood in the stool consult gastroenterology patient will have upper endoscopy today  STEPHANIE angie Frederick MD

## 2024-02-29 NOTE — NURSING NOTE
Patient requesting Pain meds for generalized pain. Blood pressure ortho positive. [facility] contacted regarding giving ultram. Ok by [facility] to give ultram but would like nursing to continue to monitor vitals. Patient in the recliner chair during the night for comfort. Patient had one stool this shift and continent of bladder

## 2024-02-29 NOTE — ANESTHESIA POSTPROCEDURE EVALUATION
Patient: Reyes Cornelius    Procedure Summary       Date: 02/29/24 Room / Location: [facility] [place]    Anesthesia Start: 1038 Anesthesia Stop: 1057    Procedure: EGD Diagnosis: Iron deficiency anemia due to chronic blood loss    Scheduled Providers: Osito Duenas MD Responsible Provider: Pam Medina MD    Anesthesia Type: MAC ASA Status: 4            Anesthesia Type: MAC    Vitals Value Taken Time   /60 02/29/24 1057   Temp 36 02/29/24 1057   Pulse 58 02/29/24 1057   Resp 16 02/29/24 1057   SpO2 98 02/29/24 1057       Anesthesia Post Evaluation    Patient location during evaluation: PACU  Patient participation: complete - patient participated  Level of consciousness: awake and alert  Pain management: adequate  Airway patency: patent  Cardiovascular status: acceptable  Respiratory status: acceptable  Hydration status: acceptable  Postoperative Nausea and Vomiting: none        There were no known notable events for this encounter.

## 2024-02-29 NOTE — CARE PLAN
Pt underwent EGD with Dr. Duenas noting gastritis, 3-4 angioectasias in the duodenal bulb with stigmata of recent hemorrhage treated with APC.     Plan:  - continue supportive care  - continue to trend hgb daily unless pt becomes symptomatic or decompensates  - transfuse to maintain hgb >7  - continue to monitor for overt signs of bleeding  - no NSAIDS  - ok to continue PPI BID for now  - VCE as outpatient  - high resolution manometry to evaluate esophageal motility as outpatient    Plan has been discussed with Dr. Kaplan. GI will continue to follow peripherally.    Ely Dillon, APRN/CNP

## 2024-02-29 NOTE — ANESTHESIA PREPROCEDURE EVALUATION
Patient: Reyes Cornelius    Procedure Information       Date/Time: 02/29/24 1320    Scheduled providers: Osito Duenas MD    Procedure: EGD    [place] [facility] [place]            Relevant Problems   Cardiovascular   (+) A-fib (CMS/HCC)   (+) AAA (abdominal aortic aneurysm) (CMS/HCC)   (+) CAD (coronary artery disease)   (+) Essential hypertension   (+) HLD (hyperlipidemia)   (+) Pulmonary hypertension (CMS/HCC)      Endocrine   (+) Morbid obesity (CMS/HCC)      GI   (+) GI bleed      /Renal   (+) Acute kidney injury superimposed on chronic kidney disease (CMS/HCC)   (+) CKD (chronic kidney disease), stage III (CMS/HCC)      Pulmonary   (+) Obstructive sleep apnea, adult   (+) Pulmonary hypertension (CMS/HCC)      Hematology   (+) Anemia, unspecified      Musculoskeletal   (+) Osteoarthritis      Circulatory   (+) Hypotension      Symptoms and Signs   (+) Weakness       Clinical information reviewed:   Tobacco  Allergies  Meds   Med Hx  Surg Hx   Fam Hx  Soc Hx        NPO Detail:  No data recorded     Physical Exam    Airway  Mallampati: II  TM distance: >3 FB  Neck ROM: full     Cardiovascular   Rhythm: regular  Rate: normal     Dental    Pulmonary   Breath sounds clear to auscultation     Abdominal   Abdomen: soft             Anesthesia Plan    History of general anesthesia?: yes  History of complications of general anesthesia?: no    ASA 4     general     intravenous induction   Postoperative administration of opioids is intended.  Anesthetic plan and risks discussed with patient.    Plan discussed with CAA, CRNA and attending.

## 2024-03-01 VITALS
DIASTOLIC BLOOD PRESSURE: 64 MMHG | WEIGHT: 297.4 LBS | TEMPERATURE: 99 F | SYSTOLIC BLOOD PRESSURE: 113 MMHG | HEIGHT: 73 IN | RESPIRATION RATE: 19 BRPM | BODY MASS INDEX: 39.42 KG/M2 | OXYGEN SATURATION: 98 % | HEART RATE: 105 BPM

## 2024-03-01 LAB
ANION GAP SERPL CALC-SCNC: 11 MMOL/L (ref 10–20)
BUN SERPL-MCNC: 52 MG/DL (ref 6–23)
CALCIUM SERPL-MCNC: 9 MG/DL (ref 8.6–10.3)
CHLORIDE SERPL-SCNC: 113 MMOL/L (ref 98–107)
CO2 SERPL-SCNC: 18 MMOL/L (ref 21–32)
CREAT SERPL-MCNC: 1.46 MG/DL (ref 0.5–1.3)
EGFRCR SERPLBLD CKD-EPI 2021: 50 ML/MIN/1.73M*2
ERYTHROCYTE [DISTWIDTH] IN BLOOD BY AUTOMATED COUNT: 16.7 % (ref 11.5–14.5)
GLUCOSE SERPL-MCNC: 89 MG/DL (ref 74–99)
HCT VFR BLD AUTO: 30 % (ref 41–52)
HGB BLD-MCNC: 9.1 G/DL (ref 13.5–17.5)
MAGNESIUM SERPL-MCNC: 2.13 MG/DL (ref 1.6–2.4)
MCH RBC QN AUTO: 29.4 PG (ref 26–34)
MCHC RBC AUTO-ENTMCNC: 30.3 G/DL (ref 32–36)
MCV RBC AUTO: 97 FL (ref 80–100)
NRBC BLD-RTO: 0 /100 WBCS (ref 0–0)
PLATELET # BLD AUTO: 183 X10*3/UL (ref 150–450)
POTASSIUM SERPL-SCNC: 5 MMOL/L (ref 3.5–5.3)
RBC # BLD AUTO: 3.09 X10*6/UL (ref 4.5–5.9)
SODIUM SERPL-SCNC: 137 MMOL/L (ref 136–145)
WBC # BLD AUTO: 5.6 X10*3/UL (ref 4.4–11.3)

## 2024-03-01 PROCEDURE — 85027 COMPLETE CBC AUTOMATED: CPT | Performed by: NURSE PRACTITIONER

## 2024-03-01 PROCEDURE — 80048 BASIC METABOLIC PNL TOTAL CA: CPT | Performed by: NURSE PRACTITIONER

## 2024-03-01 PROCEDURE — 99232 SBSQ HOSP IP/OBS MODERATE 35: CPT | Performed by: NURSE PRACTITIONER

## 2024-03-01 PROCEDURE — 97110 THERAPEUTIC EXERCISES: CPT | Mod: GP,CQ

## 2024-03-01 PROCEDURE — 2500000004 HC RX 250 GENERAL PHARMACY W/ HCPCS (ALT 636 FOR OP/ED): Performed by: NURSE PRACTITIONER

## 2024-03-01 PROCEDURE — 83735 ASSAY OF MAGNESIUM: CPT | Performed by: NURSE PRACTITIONER

## 2024-03-01 PROCEDURE — 2500000001 HC RX 250 WO HCPCS SELF ADMINISTERED DRUGS (ALT 637 FOR MEDICARE OP): Performed by: INTERNAL MEDICINE

## 2024-03-01 PROCEDURE — 97116 GAIT TRAINING THERAPY: CPT | Mod: GP,CQ

## 2024-03-01 PROCEDURE — 36415 COLL VENOUS BLD VENIPUNCTURE: CPT | Performed by: NURSE PRACTITIONER

## 2024-03-01 PROCEDURE — 2500000001 HC RX 250 WO HCPCS SELF ADMINISTERED DRUGS (ALT 637 FOR MEDICARE OP): Performed by: NURSE PRACTITIONER

## 2024-03-01 PROCEDURE — C9113 INJ PANTOPRAZOLE SODIUM, VIA: HCPCS | Performed by: NURSE PRACTITIONER

## 2024-03-01 RX ORDER — PANTOPRAZOLE SODIUM 40 MG/1
40 TABLET, DELAYED RELEASE ORAL
Qty: 30 TABLET | Refills: 0 | Status: SHIPPED | OUTPATIENT
Start: 2024-03-01 | End: 2024-03-31

## 2024-03-01 RX ORDER — CALCIUM CARBONATE 200(500)MG
1000 TABLET,CHEWABLE ORAL ONCE
Status: COMPLETED | OUTPATIENT
Start: 2024-03-01 | End: 2024-03-01

## 2024-03-01 RX ORDER — METOPROLOL SUCCINATE 25 MG/1
25 TABLET, EXTENDED RELEASE ORAL DAILY
Qty: 30 TABLET | Refills: 0 | Status: SHIPPED | OUTPATIENT
Start: 2024-03-02 | End: 2024-03-01 | Stop reason: SDUPTHER

## 2024-03-01 RX ORDER — METOPROLOL SUCCINATE 25 MG/1
25 TABLET, EXTENDED RELEASE ORAL DAILY
Qty: 30 TABLET | Refills: 0 | Status: SHIPPED | OUTPATIENT
Start: 2024-03-02 | End: 2024-04-01

## 2024-03-01 RX ADMIN — FINASTERIDE 5 MG: 5 TABLET, FILM COATED ORAL at 08:30

## 2024-03-01 RX ADMIN — EPLERENONE 25 MG: 25 TABLET, FILM COATED ORAL at 08:30

## 2024-03-01 RX ADMIN — CALCIUM CARBONATE (ANTACID) CHEW TAB 500 MG 1000 MG: 500 CHEW TAB at 03:45

## 2024-03-01 RX ADMIN — PANTOPRAZOLE SODIUM 40 MG: 40 INJECTION, POWDER, FOR SOLUTION INTRAVENOUS at 08:30

## 2024-03-01 RX ADMIN — Medication 1 TABLET: at 08:30

## 2024-03-01 RX ADMIN — METOPROLOL SUCCINATE 25 MG: 25 TABLET, EXTENDED RELEASE ORAL at 08:30

## 2024-03-01 RX ADMIN — Medication 1000 UNITS: at 08:30

## 2024-03-01 ASSESSMENT — PAIN DESCRIPTION - DESCRIPTORS: DESCRIPTORS: ACHING

## 2024-03-01 ASSESSMENT — PAIN - FUNCTIONAL ASSESSMENT: PAIN_FUNCTIONAL_ASSESSMENT: 0-10

## 2024-03-01 ASSESSMENT — COGNITIVE AND FUNCTIONAL STATUS - GENERAL
CLIMB 3 TO 5 STEPS WITH RAILING: A LOT
MOBILITY SCORE: 17
MOVING TO AND FROM BED TO CHAIR: A LITTLE
WALKING IN HOSPITAL ROOM: A LITTLE
MOVING FROM LYING ON BACK TO SITTING ON SIDE OF FLAT BED WITH BEDRAILS: A LITTLE
STANDING UP FROM CHAIR USING ARMS: A LITTLE
TURNING FROM BACK TO SIDE WHILE IN FLAT BAD: A LITTLE

## 2024-03-01 NOTE — CARE PLAN
The patient's goals for the shift include      The clinical goals for the shift include see poc      Problem: Pain - Adult  Goal: Verbalizes/displays adequate comfort level or baseline comfort level  Outcome: Progressing     Problem: Safety - Adult  Goal: Free from fall injury  Outcome: Progressing     Problem: Discharge Planning  Goal: Discharge to home or other facility with appropriate resources  Outcome: Progressing     Problem: Chronic Conditions and Co-morbidities  Goal: Patient's chronic conditions and co-morbidity symptoms are monitored and maintained or improved  Outcome: Progressing     Problem: Fall/Injury  Goal: Not fall by end of shift  Outcome: Met  Goal: Be free from injury by end of the shift  Outcome: Met  Goal: Verbalize understanding of personal risk factors for fall in the hospital  Outcome: Progressing  Goal: Verbalize understanding of risk factor reduction measures to prevent injury from fall in the home  Outcome: Progressing  Goal: Use assistive devices by end of the shift  Outcome: Met  Goal: Pace activities to prevent fatigue by end of the shift  Outcome: Met     Problem: Pain  Goal: Takes deep breaths with improved pain control throughout the shift  Outcome: Met  Goal: Turns in bed with improved pain control throughout the shift  Outcome: Met  Goal: Walks with improved pain control throughout the shift  Outcome: Met  Goal: Performs ADL's with improved pain control throughout shift  Outcome: Met  Goal: Participates in PT with improved pain control throughout the shift  Outcome: Met  Goal: Free from opioid side effects throughout the shift  Outcome: Met  Goal: Free from acute confusion related to pain meds throughout the shift  Outcome: Met     Problem: Dressings Lower Extremities  Goal: STG - Patient to complete lower body dressing MOD I  Outcome: Progressing     Problem: Grooming  Goal: STG - Patient completes grooming MOD I  Outcome: Progressing  Goal: STG - Patient will tolerate standing  3-6 min  Outcome: Progressing

## 2024-03-01 NOTE — NURSING NOTE
Patient alert and oriented x3. Patient ambulatory in room with walker and staff assist. No c/o pain. Patient tolerating diet with incident of heartburn and medicated per MAR. Patient had black tarry solid BM this shift. Patient remains ortho positive. No acute changes this shift. Patient currently in bed resting with home cpap machine in use

## 2024-03-01 NOTE — CARE PLAN
Problem: Pain - Adult  Goal: Verbalizes/displays adequate comfort level or baseline comfort level  3/1/2024 0513 by Alee Rivera LPN  Outcome: Progressing  3/1/2024 0509 by Alee Rivera LPN  Outcome: Progressing   The patient's goals for the shift include      The clinical goals for the shift include see poc      Problem: Pain - Adult  Goal: Verbalizes/displays adequate comfort level or baseline comfort level  3/1/2024 0513 by Alee Rivera LPN  Outcome: Progressing  3/1/2024 0509 by Alee Rivera LPN  Outcome: Progressing     Problem: Safety - Adult  Goal: Free from fall injury  3/1/2024 0513 by Alee Rivera LPN  Outcome: Progressing  3/1/2024 0509 by Alee Rivera LPN  Outcome: Progressing     Problem: Discharge Planning  Goal: Discharge to home or other facility with appropriate resources  3/1/2024 0513 by Alee Rivera LPN  Outcome: Progressing  3/1/2024 0509 by Alee Rivera LPN  Outcome: Progressing     Problem: Chronic Conditions and Co-morbidities  Goal: Patient's chronic conditions and co-morbidity symptoms are monitored and maintained or improved  3/1/2024 0513 by Alee Rivera LPN  Outcome: Progressing  3/1/2024 0509 by Alee Rivera LPN  Outcome: Progressing     Problem: Fall/Injury  Goal: Not fall by end of shift  Outcome: Met  Goal: Be free from injury by end of the shift  Outcome: Met  Goal: Verbalize understanding of personal risk factors for fall in the hospital  3/1/2024 0513 by Alee Rivera LPN  Outcome: Progressing  3/1/2024 0509 by Alee Rivera LPN  Outcome: Progressing  Goal: Verbalize understanding of risk factor reduction measures to prevent injury from fall in the home  3/1/2024 0513 by Alee Rivera LPN  Outcome: Progressing  3/1/2024 0509 by Alee Rivera LPN  Outcome: Progressing  Goal: Use assistive devices by end of the shift  Outcome: Met  Goal: Pace activities to prevent fatigue by end of the shift  Outcome: Met     Problem: Pain  Goal: Takes deep breaths with improved pain  control throughout the shift  Outcome: Met  Goal: Turns in bed with improved pain control throughout the shift  Outcome: Met  Goal: Walks with improved pain control throughout the shift  Outcome: Met  Goal: Performs ADL's with improved pain control throughout shift  Outcome: Met  Goal: Participates in PT with improved pain control throughout the shift  Outcome: Met  Goal: Free from opioid side effects throughout the shift  Outcome: Met  Goal: Free from acute confusion related to pain meds throughout the shift  Outcome: Met     Problem: Dressings Lower Extremities  Goal: STG - Patient to complete lower body dressing MOD I  3/1/2024 0513 by Alee Rivera LPN  Outcome: Progressing  3/1/2024 0509 by Alee Rivera LPN  Outcome: Progressing     Problem: Grooming  Goal: STG - Patient completes grooming MOD I  3/1/2024 0513 by Alee Rivera LPN  Outcome: Progressing  3/1/2024 0509 by Alee Rivera LPN  Outcome: Progressing  Goal: STG - Patient will tolerate standing 3-6 min  3/1/2024 0513 by Alee Rivera LPN  Outcome: Progressing  3/1/2024 0509 by Alee Rivera LPN  Outcome: Progressing

## 2024-03-01 NOTE — DISCHARGE INSTRUCTIONS
Schedule outpt video capsule study - call [facility] for approval or schedule through their services  NO NSAIDs  Continue to wear compression stockings

## 2024-03-01 NOTE — PROGRESS NOTES
Physical Therapy    Physical Therapy Treatment    Patient Name: Reyes Cornelius  MRN: [id number]  Today's Date: 3/1/2024  Time Calculation  Start Time: 0830  Stop Time: 0856  Time Calculation (min): 26 min       Assessment/Plan   PT Assessment  PT Assessment Results: Decreased strength, Decreased endurance, Impaired balance, Decreased mobility  Rehab Prognosis: Good  Evaluation/Treatment Tolerance: Patient tolerated treatment well  End of Session Communication: Bedside nurse  Assessment Comment: patient tolerated session well.  no dizziness reported with transfers and gait.  good performance of therex despite increased pain in bilateral LE's  End of Session Patient Position: Up in chair, Alarm on  PT Plan  Inpatient/Swing Bed or Outpatient: Inpatient  PT Plan  Treatment/Interventions: Transfer training, Gait training, Therapeutic exercise  PT Plan: Skilled PT  PT Frequency: 5 times per week  PT Discharge Recommendations: Low intensity level of continued care  Equipment Recommended upon Discharge: Wheeled walker  PT Recommended Transfer Status: Contact guard       03/01/24 0830   PT  Visit   PT Received On 03/01/24   Response to Previous Treatment Patient with no complaints from previous session.   General   Prior to Session Communication Bedside nurse   Patient Position Received Alarm on;Up in chair   Preferred Learning Style verbal;visual   General Comment Pt agreeable to therapy   Precautions   Medical Precautions Fall precautions   Precautions Comment orthostatic +   Pain Assessment   Pain Assessment 0-10   Pain Score   (did not rate)   Pain Type Chronic pain   Pain Location Leg   Pain Orientation Right;Left   Pain Descriptors Aching   Cognition   Overall Cognitive Status WFL   Orientation Level Oriented X4   Therapeutic Exercise   Therapeutic Exercise Performed Yes   Therapeutic Exercise Activity 1 PF/DF x15   Therapeutic Exercise Activity 2 LAQ 2x10   Therapeutic Exercise Activity 3 marches x15   Therapeutic  Exercise Activity 4 pillow squeeze x15   Therapeutic Exercise Activity 5 resisted hip ABD x15   Ambulation/Gait Training   Ambulation/Gait Training Performed Yes   Ambulation/Gait Training 1   Surface 1 Level tile   Device 1 Rolling walker   Gait Support Devices Gait belt   Assistance 1 Contact guard   Quality of Gait 1 WBOS;Decreased step length   Comments/Distance (ft) 1 45' x2   Transfers   Transfer Yes   Transfer 1   Transfer From 1 Chair with arms to   Transfer to 1 Stand   Technique 1 Sit to stand   Transfer Device 1 Walker;Gait belt   Transfer Level of Assistance 1 Close supervision   Transfers 3   Transfer From 3 Stand to   Transfer to 3 Toilet   Technique 3 Stand to sit   Transfer Device 3 Walker   Transfer Level of Assistance 3 Contact guard   Activity Tolerance   Endurance Endurance does not limit participation in activity   PT Assessment   PT Assessment Results Decreased strength;Decreased endurance;Impaired balance;Decreased mobility   Rehab Prognosis Good   Evaluation/Treatment Tolerance Patient tolerated treatment well   End of Session Communication Bedside nurse   Assessment Comment patient tolerated session well.  no dizziness reported with transfers and gait.  good performance of therex despite increased pain in bilateral LE's   End of Session Patient Position Up in chair;Alarm on   Outpatient Education   Individual(s) Educated Patient   Education Provided Fall Risk   PT Plan   Inpatient/Swing Bed or Outpatient Inpatient   PT Plan   Treatment/Interventions Transfer training;Gait training;Therapeutic exercise   PT Plan Skilled PT   PT Frequency 5 times per week   PT Discharge Recommendations Low intensity level of continued care   Equipment Recommended upon Discharge Wheeled walker   PT Recommended Transfer Status Contact guard     Outcome Measures:  [facility] Basic Mobility  Turning from your back to your side while in a flat bed without using bedrails: A little  Moving from lying on your back to  sitting on the side of a flat bed without using bedrails: A little  Moving to and from bed to chair (including a wheelchair): A little  Standing up from a chair using your arms (e.g. wheelchair or bedside chair): A little  To walk in hospital room: A little  Climbing 3-5 steps with railing: A lot  Basic Mobility - Total Score: 17      EDUCATION:  Outpatient Education  Individual(s) Educated: Patient  Education Provided: Fall Risk  Education Documentation  Mobility Training, taught by Jose L Salgado PTA at 3/1/2024  9:00 AM.  Learner: Patient  Readiness: Acceptance  Method: Explanation  Response: Verbalizes Understanding, Demonstrated Understanding          GOALS:  Encounter Problems       Encounter Problems (Active)       PT Problem       Pt will demonstrate mod I for all bed mobility   (Progressing)       Start:  02/28/24    Expected End:  03/13/24            Pt will demonstrate mod I for all transfers with WW  (Progressing)       Start:  02/28/24    Expected End:  03/13/24            Pt will ambulate 150 ft with WW and mod I without hypotension.  (Progressing)       Start:  02/28/24    Expected End:  03/13/24            Pt will be able to negotiate 1 steps with LRD and mod I.  (Progressing)       Start:  02/28/24    Expected End:  03/13/24               Pain - Adult          Transfers       STG - Patient will perform toilet transfer MOD I (Progressing)       Start:  02/28/24    Expected End:  03/13/24

## 2024-03-01 NOTE — PROGRESS NOTES
"Subjective  Patient sitting up in chair in NAD.  Tolerating diet.  S/p EGD yesterday with Dr. 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  BP stable.  Plan for outpatient video capsule endoscopy.  Patient aware and states he would need [facility].  Will order procedure but may need to be [facility] through [facility].    Objective  Blood pressure 117/72, pulse 94, temperature 37.2 °C (99 °F), temperature source Temporal, resp. rate 19, height 1.854 m (6' 1\"), weight 135 kg (297 lb 6.4 oz), SpO2 98 %.    Physical Exam  Constitutional: Alert, pleasant and interactive, in NAD  Eyes: PERRL, sclera clear, no conjunctival injection  Skin: Warm and dry, no rash or ecchymosis  ENMT: Mucous membranes moist, no lesions noted  Resp: CTAB, even and unlabored  CV: RRR, normal S1, S2, systolic murmur  GI: +BS, soft, round, NT, no rebound tenderness or guarding, no palpable masses or organomegaly  MSK: 5/5 strength, ROM intact, no joint swelling  Extremities: Extremities warm, bilateral LE edema, no contusions, wounds or cyanosis  Neuro: Alert and oriented x3  Psych: Appropriate mood and behavior    Medications  Scheduled medications  atorvastatin, 80 mg, oral, Nightly  cholecalciferol, 1,000 Units, oral, Daily  docusate sodium, 100 mg, oral, BID  eplerenone, 25 mg, oral, Daily  finasteride, 5 mg, oral, Daily  metoprolol succinate XL, 25 mg, oral, Daily  multivitamin with minerals, 1 tablet, oral, Daily  pantoprazole, 40 mg, intravenous, BID  [Held by provider] tamsulosin, 0.8 mg, oral, Nightly      Continuous medications     PRN medications  PRN medications: fluticasone, ipratropium-albuteroL, melatonin, oxygen, polyethylene glycol, traMADol     Labs  Lab Results   Component Value Date    WBC 5.6 03/01/2024    HGB 9.1 (L) 03/01/2024    HCT 30.0 (L) 03/01/2024    MCV 97 03/01/2024     03/01/2024     Lab Results   Component Value Date    GLUCOSE 89 03/01/2024    CALCIUM 9.0 03/01/2024     03/01/2024    K " 5.0 03/01/2024    CO2 18 (L) 03/01/2024     (H) 03/01/2024    BUN 52 (H) 03/01/2024    CREATININE 1.46 (H) 03/01/2024     Lab Results   Component Value Date    ALT 11 02/27/2024    AST 9 02/27/2024    ALKPHOS 90 02/27/2024    BILITOT 0.6 02/27/2024     Lab Results   Component Value Date    IRON 20 (L) 02/28/2024    TIBC 254 02/28/2024    FERRITIN 22 02/28/2024     Lab Results   Component Value Date    INR 1.5 (H) 02/28/2024    PROTIME 16.8 (H) 02/28/2024     EGD 2/29/2024 with Dr. Duenas noting:  Findings  The cricopharynx, upper third of the esophagus, middle third of the esophagus, lower third of the esophagus and GE junction appeared normal.  Liquid food inside the esophagus. Decrease peristaltic was noted.  Moderate edematous and erythematous mucosa in the cardia, fundus of the stomach, body of the stomach and antrum, consistent with gastritis; performed cold forceps biopsy;  3 4 mm angioectasias in the duodenal bulb; there was stigmata of recent hemorrhage; 4 lesions were ablated with argon plasma coagulation using a straight fire probe (20 W and 1.5 mL/min flow rate)  The 2nd part of the duodenum appeared normal.     Recommendation    Await pathology results     VCE as outpatient  HRM to evaluate esophageal motality as outpatient        Radiology  CXR 2/27/2024 noting:  Impression:     No evidence of consolidating infiltrate or effusion.  Signed by Thomas Mackey MD       Assessment  Reyes Cornelius is a 73 y.o. male presenting with symptomatic hypotension.  PMH significant for A-fib on Eliquis, CKD, obesity, ANGELITO on CPAP, CAD s/p PTCA presenting from PCP office.  Hemoglobin 10 on admission down to 8.4, noted to be 14 in 2021.  Patient underwent EGD with Dr. Duenas yesterday noting gastritis and duodenal AVMs treated with APC.    # symptomatic hypotension  # acute on chronic anemia- stable  # a fib on Eliquis  # ANGELITO on CPAP  # CAD s/p PCI    Plan:  - continue supportive care  - diet per primary team  -  continue to trend hgb daily unless pt becomes symptomatic or decompensates  - transfuse to maintain hgb >7  - continue to monitor for overt signs of bleeding  - no NSAIDS  - continue PPI daily  - may resume Eliquis at previous dose per cardiology  -pt should have outpt video capsule endoscopy for further evaluation for causes of anemia- order placed    Thank you for allowing us to participate in care. Please call with any further questions or concerns.      Plan has been discussed with Dr. Duenas. GI will sign off.     Ely Dillon APRN/CNP

## 2024-03-01 NOTE — PROGRESS NOTES
Medical information release form sent to medical records. State they will send his info after he is discharged. [facility]. She is aware they will send everything when he is discharged.     Eileen Sheriff RN

## 2024-03-01 NOTE — DISCHARGE SUMMARY
Discharge Diagnosis  Weakness    Issues Requiring Follow-Up  Follow-up with GI    Discharge Meds     Your medication list        ASK your doctor about these medications        Instructions Last Dose Given Next Dose Due   allopurinol 300 mg tablet  Commonly known as: Zyloprim           ascorbic acid 500 mg tablet  Commonly known as: Vitamin C           aspirin 81 mg EC tablet           atorvastatin 80 mg tablet  Commonly known as: Lipitor           cholecalciferol 25 MCG (1000 UT) tablet  Commonly known as: Vitamin D-3           Eliquis 5 mg tablet  Generic drug: apixaban           eplerenone 25 mg tablet  Commonly known as: Inspra           finasteride 5 mg tablet  Commonly known as: Proscar           Flomax 0.4 mg 24 hr capsule  Generic drug: tamsulosin           fluticasone 50 mcg/actuation nasal spray  Commonly known as: Flonase           furosemide 40 mg tablet  Commonly known as: Lasix           Jardiance 25 mg  Generic drug: empagliflozin           lisinopril 20 mg tablet           metoprolol succinate  mg 24 hr tablet  Commonly known as: Toprol-XL           multivitamin with minerals tablet           mupirocin 2 % ointment  Commonly known as: Bactroban           traMADol 50 mg tablet  Commonly known as: Ultram           zinc gluconate 50 mg tablet                    Test Results Pending At Discharge  Pending Labs       Order Current Status    Surgical Pathology Exam In process            Hospital Course   Patient was admitted to the hospital secondary to weakness and hypotension patient blood pressure was low and diagnosed with acute kidney injury patient started on IV fluid with improvement of his kidney numbers patient had positive guaiac test and had upper endoscopy consistent with AV malformation and gastritis started on PPI anticoagulation kept on hold waiting for GI clearance to resume anticoagulation since the patient had atrial fibrillation    Pertinent Physical Exam At Time of  Discharge  Physical Exam  HENT:      Right Ear: External ear normal.      Left Ear: External ear normal.      Mouth/Throat:      Mouth: Mucous membranes are moist.   Cardiovascular:      Rate and Rhythm: Normal rate and regular rhythm.      Heart sounds: No murmur heard.     No friction rub. No gallop.   Pulmonary:      Effort: No accessory muscle usage or respiratory distress.      Breath sounds: No stridor. No wheezing or rhonchi.   Chest:      Chest wall: No tenderness.   Abdominal:      General: There is no distension.      Palpations: There is no mass.      Tenderness: There is no abdominal tenderness. There is no guarding or rebound.   Musculoskeletal:         General: No deformity or signs of injury.      Cervical back: No rigidity or tenderness. Normal range of motion.      Right lower leg: No edema.      Left lower leg: No edema.   Skin:     Coloration: Skin is not jaundiced or pale.      Findings: No lesion.   Neurological:      General: No focal deficit present.      Mental Status: He is alert, oriented to person, place, and time and easily aroused.      Cranial Nerves: No cranial nerve deficit.      Sensory: No sensory deficit.      Motor: No weakness.         Outpatient Follow-Up  No future appointments.      CHIKA Frederick MD

## 2024-03-02 LAB
ATRIAL RATE: 312 BPM
Q ONSET: 211 MS
QRS COUNT: 13 BEATS
QRS DURATION: 90 MS
QT INTERVAL: 380 MS
QTC CALCULATION(BAZETT): 452 MS
QTC FREDERICIA: 426 MS
R AXIS: -54 DEGREES
T AXIS: 31 DEGREES
T OFFSET: 401 MS
VENTRICULAR RATE: 85 BPM

## 2024-03-05 LAB
Q ONSET: 210 MS
QRS COUNT: 15 BEATS
QRS DURATION: 98 MS
QT INTERVAL: 356 MS
QTC CALCULATION(BAZETT): 442 MS
QTC FREDERICIA: 412 MS
R AXIS: -54 DEGREES
T AXIS: 33 DEGREES
T OFFSET: 388 MS
VENTRICULAR RATE: 93 BPM

## 2024-03-06 LAB
LABORATORY COMMENT REPORT: NORMAL
PATH REPORT.FINAL DX SPEC: NORMAL
PATH REPORT.GROSS SPEC: NORMAL
PATH REPORT.RELEVANT HX SPEC: NORMAL
PATH REPORT.TOTAL CANCER: NORMAL

## 2024-03-21 ENCOUNTER — OFFICE VISIT (OUTPATIENT)
Dept: PRIMARY CARE | Facility: CLINIC | Age: 74
End: 2024-03-21
Payer: MEDICARE

## 2024-03-21 VITALS
HEIGHT: 73 IN | DIASTOLIC BLOOD PRESSURE: 60 MMHG | BODY MASS INDEX: 40.29 KG/M2 | WEIGHT: 304 LBS | HEART RATE: 75 BPM | RESPIRATION RATE: 14 BRPM | TEMPERATURE: 97.2 F | SYSTOLIC BLOOD PRESSURE: 130 MMHG | OXYGEN SATURATION: 94 %

## 2024-03-21 DIAGNOSIS — N18.31 STAGE 3A CHRONIC KIDNEY DISEASE (MULTI): ICD-10-CM

## 2024-03-21 DIAGNOSIS — E78.5 HYPERLIPIDEMIA, UNSPECIFIED HYPERLIPIDEMIA TYPE: ICD-10-CM

## 2024-03-21 DIAGNOSIS — Z00.00 PERIODIC HEALTH ASSESSMENT, GENERAL SCREENING, ADULT: Primary | ICD-10-CM

## 2024-03-21 DIAGNOSIS — I71.40 ABDOMINAL AORTIC ANEURYSM (AAA) WITHOUT RUPTURE, UNSPECIFIED PART (CMS-HCC): ICD-10-CM

## 2024-03-21 DIAGNOSIS — E66.01 MORBID OBESITY (MULTI): ICD-10-CM

## 2024-03-21 DIAGNOSIS — D50.9 IRON DEFICIENCY ANEMIA, UNSPECIFIED IRON DEFICIENCY ANEMIA TYPE: ICD-10-CM

## 2024-03-21 DIAGNOSIS — Z00.00 WELLNESS EXAMINATION: ICD-10-CM

## 2024-03-21 DIAGNOSIS — I89.0 LYMPHEDEMA: ICD-10-CM

## 2024-03-21 PROCEDURE — 99397 PER PM REEVAL EST PAT 65+ YR: CPT | Performed by: INTERNAL MEDICINE

## 2024-03-21 PROCEDURE — 1036F TOBACCO NON-USER: CPT | Performed by: INTERNAL MEDICINE

## 2024-03-21 PROCEDURE — 1170F FXNL STATUS ASSESSED: CPT | Performed by: INTERNAL MEDICINE

## 2024-03-21 PROCEDURE — 1111F DSCHRG MED/CURRENT MED MERGE: CPT | Performed by: INTERNAL MEDICINE

## 2024-03-21 PROCEDURE — 1159F MED LIST DOCD IN RCRD: CPT | Performed by: INTERNAL MEDICINE

## 2024-03-21 PROCEDURE — 3078F DIAST BP <80 MM HG: CPT | Performed by: INTERNAL MEDICINE

## 2024-03-21 PROCEDURE — 1160F RVW MEDS BY RX/DR IN RCRD: CPT | Performed by: INTERNAL MEDICINE

## 2024-03-21 PROCEDURE — 1123F ACP DISCUSS/DSCN MKR DOCD: CPT | Performed by: INTERNAL MEDICINE

## 2024-03-21 PROCEDURE — 1158F ADVNC CARE PLAN TLK DOCD: CPT | Performed by: INTERNAL MEDICINE

## 2024-03-21 PROCEDURE — 3075F SYST BP GE 130 - 139MM HG: CPT | Performed by: INTERNAL MEDICINE

## 2024-03-21 ASSESSMENT — ENCOUNTER SYMPTOMS
NAUSEA: 0
SHORTNESS OF BREATH: 0
COUGH: 0
DIARRHEA: 0
PALPITATIONS: 0
WHEEZING: 0
CONSTIPATION: 0

## 2024-03-21 ASSESSMENT — ACTIVITIES OF DAILY LIVING (ADL)
GROCERY_SHOPPING: INDEPENDENT
MANAGING_FINANCES: INDEPENDENT
BATHING: INDEPENDENT
DRESSING: INDEPENDENT
DOING_HOUSEWORK: INDEPENDENT
TAKING_MEDICATION: INDEPENDENT

## 2024-03-21 ASSESSMENT — PATIENT HEALTH QUESTIONNAIRE - PHQ9
2. FEELING DOWN, DEPRESSED OR HOPELESS: NOT AT ALL
1. LITTLE INTEREST OR PLEASURE IN DOING THINGS: NOT AT ALL
SUM OF ALL RESPONSES TO PHQ9 QUESTIONS 1 AND 2: 0

## 2024-03-21 NOTE — PROGRESS NOTES
"Subjective   Patient ID: Reyes Cornelius is a 73 y.o. male who presents for [facility] and Medicare Annual Wellness Visit Subsequent.    Overall doing well.  Patient is fairly active.  Denies any issues with CP,SOB or dizzy spells.  No issues with anxiety, depression or sleep related problems. Denies any issues with HA, numbness or tingling.  Chronic LE swelling/lymphedema for which he wraps his legs for.  This is improving.  No issues or changes with bowel or bladder habits.      Review of Systems   Respiratory:  Negative for cough, shortness of breath and wheezing.    Cardiovascular:  Negative for chest pain and palpitations.   Gastrointestinal:  Negative for constipation, diarrhea and nausea.       Objective   /60 (BP Location: Left arm, Patient Position: Sitting, BP Cuff Size: Adult)   Pulse 75   Temp 36.2 °C (97.2 °F) (Tympanic)   Resp 14   Ht 1.854 m (6' 1\")   Wt 138 kg (304 lb)   SpO2 94%   BMI 40.11 kg/m²     Physical Exam  Vitals reviewed.   Constitutional:       Appearance: Normal appearance.   HENT:      Head: Normocephalic.   Cardiovascular:      Rate and Rhythm: Normal rate and regular rhythm.   Pulmonary:      Effort: Pulmonary effort is normal.      Breath sounds: Normal breath sounds.   Musculoskeletal:         General: Normal range of motion.   Neurological:      General: No focal deficit present.      Mental Status: He is alert.   Psychiatric:         Mood and Affect: Mood normal.         Assessment/Plan   Problem List Items Addressed This Visit             ICD-10-CM    Anemia, unspecified D64.9    AAA (abdominal aortic aneurysm) (CMS/[facility]) I71.40    Relevant Orders    CT angio aorta and bilateral iliofemoral runoff w and or wo IV contrast    CKD (chronic kidney disease), stage III (CMS/HCC) N18.30    HLD (hyperlipidemia) E78.5    Lymphedema I89.0    Morbid obesity (CMS/[facility]) E66.01     Other Visit Diagnoses         Codes    Periodic health assessment, general screening, adult    -  Primary " Z00.00    Wellness examination     Z00.00        Physical exam is unremarkable.  We reviewed and discussed all the above.  We discussed current medications as well as most recent test results.  HTN is controlled.    HLD is controlled (we reviewed results from [facility] that he brought).  Due for AAA reassessment.    We discussed the importance and benefits of a healthy diet that is both low in sugars and low in saturated fats.  We reviewed and discussed the benefits of regular physical exercise - at least as much as he can.  Annual Wellness Visit questions and answers were reviewed and discussed including the importance of discussing end of life wishes as well as having a living will and health care power of .     We will continue to work on lifestyle improvements and follow-up in 6 to 12 months, sooner if any issues should arise.

## 2024-08-28 ENCOUNTER — TELEPHONE (OUTPATIENT)
Dept: GASTROENTEROLOGY | Facility: CLINIC | Age: 74
End: 2024-08-28
Payer: MEDICARE

## 2024-08-28 NOTE — TELEPHONE ENCOUNTER
Left message for Loan at the [facility] that we will need an updated authorization to schedule the VCE. Left direct number for her to contact.

## 2024-09-16 ENCOUNTER — TELEPHONE (OUTPATIENT)
Dept: GASTROENTEROLOGY | Facility: CLINIC | Age: 74
End: 2024-09-16
Payer: MEDICARE

## 2024-09-16 NOTE — TELEPHONE ENCOUNTER
Left message for Loan Maier with the [facility] to let her know that we will need and updated referral. We received a referral in office but it does not include the procedure that needs to be performed; patient needs a VCE. This is the second time this authorization is being requested.

## 2024-10-07 ENCOUNTER — TELEPHONE (OUTPATIENT)
Dept: GASTROENTEROLOGY | Facility: CLINIC | Age: 74
End: 2024-10-07
Payer: MEDICARE

## 2024-10-07 NOTE — TELEPHONE ENCOUNTER
Spoke with patient let him know that we have not received any further information from the [facility]. He should also let them know that he is need of updated labs (levels are from March)

## 2024-10-18 ENCOUNTER — TRANSCRIBE ORDERS (OUTPATIENT)
Dept: GASTROENTEROLOGY | Facility: CLINIC | Age: 74
End: 2024-10-18
Payer: MEDICARE

## 2024-10-18 DIAGNOSIS — D50.9 IRON DEFICIENCY ANEMIA, UNSPECIFIED IRON DEFICIENCY ANEMIA TYPE: ICD-10-CM

## 2024-10-18 NOTE — TELEPHONE ENCOUNTER
Received message from LULY Parra. She states the patient is being re-referred for Colon/EGD prior to VCE being approved.     Approval in chart. Patient aware of holding instructions for his Eliquis.     Scheduled for 12/12 with Dr. Sanchez at [place].

## 2024-11-27 ENCOUNTER — TELEPHONE (OUTPATIENT)
Dept: GASTROENTEROLOGY | Facility: CLINIC | Age: 74
End: 2024-11-27
Payer: MEDICARE

## 2024-11-27 DIAGNOSIS — Z12.11 COLON CANCER SCREENING: ICD-10-CM

## 2024-11-27 NOTE — TELEPHONE ENCOUNTER
Spoke with patient in regards to prescribing doctor of Eliquis. Pt states he received a letter in the mail from [facility], stating to stop Eliquis 2 days before scheduled procedure and to restart medication 1 day after procedure. Patient also instructed to stop Jardiance 3 days before scheduled procedure. Pt expressed understanding, stating no concerns or questions at this time.

## 2024-12-02 RX ORDER — POLYETHYLENE GLYCOL 3350, SODIUM SULFATE ANHYDROUS, SODIUM BICARBONATE, SODIUM CHLORIDE, POTASSIUM CHLORIDE 236; 22.74; 6.74; 5.86; 2.97 G/4L; G/4L; G/4L; G/4L; G/4L
4000 POWDER, FOR SOLUTION ORAL ONCE
Qty: 4000 ML | Refills: 0 | Status: SHIPPED | OUTPATIENT
Start: 2024-12-02 | End: 2024-12-07 | Stop reason: HOSPADM

## 2024-12-04 ENCOUNTER — CLINICAL SUPPORT (OUTPATIENT)
Dept: PREADMISSION TESTING | Facility: HOSPITAL | Age: 74
End: 2024-12-04
Payer: MEDICARE

## 2024-12-04 ENCOUNTER — APPOINTMENT (OUTPATIENT)
Dept: CARDIOLOGY | Facility: HOSPITAL | Age: 74
End: 2024-12-04
Payer: MEDICARE

## 2024-12-04 ENCOUNTER — APPOINTMENT (OUTPATIENT)
Dept: RADIOLOGY | Facility: HOSPITAL | Age: 74
End: 2024-12-04
Payer: MEDICARE

## 2024-12-04 ENCOUNTER — HOSPITAL ENCOUNTER (INPATIENT)
Facility: HOSPITAL | Age: 74
LOS: 3 days | Discharge: HOME HEALTH | End: 2024-12-07
Attending: STUDENT IN AN ORGANIZED HEALTH CARE EDUCATION/TRAINING PROGRAM
Payer: MEDICARE

## 2024-12-04 DIAGNOSIS — I27.20 PULMONARY HYPERTENSION (MULTI): ICD-10-CM

## 2024-12-04 DIAGNOSIS — I50.9 ACUTE CONGESTIVE HEART FAILURE, UNSPECIFIED HEART FAILURE TYPE: ICD-10-CM

## 2024-12-04 DIAGNOSIS — K25.4 GASTROINTESTINAL HEMORRHAGE ASSOCIATED WITH GASTRIC ULCER: ICD-10-CM

## 2024-12-04 DIAGNOSIS — J43.9 PULMONARY EMPHYSEMA, UNSPECIFIED EMPHYSEMA TYPE (MULTI): ICD-10-CM

## 2024-12-04 DIAGNOSIS — D62 ANEMIA DUE TO BLOOD LOSS, ACUTE: Primary | ICD-10-CM

## 2024-12-04 LAB
ABO GROUP (TYPE) IN BLOOD: NORMAL
ALBUMIN SERPL BCP-MCNC: 4 G/DL (ref 3.4–5)
ALP SERPL-CCNC: 98 U/L (ref 33–136)
ALT SERPL W P-5'-P-CCNC: 6 U/L (ref 10–52)
ANION GAP SERPL CALC-SCNC: 16 MMOL/L (ref 10–20)
ANTIBODY SCREEN: NORMAL
AST SERPL W P-5'-P-CCNC: 10 U/L (ref 9–39)
BASOPHILS # BLD AUTO: 0.04 X10*3/UL (ref 0–0.1)
BASOPHILS NFR BLD AUTO: 0.7 %
BILIRUB SERPL-MCNC: 1.1 MG/DL (ref 0–1.2)
BLOOD EXPIRATION DATE: NORMAL
BNP SERPL-MCNC: 290 PG/ML (ref 0–99)
BUN SERPL-MCNC: 32 MG/DL (ref 6–23)
CALCIUM SERPL-MCNC: 9.1 MG/DL (ref 8.6–10.3)
CARDIAC TROPONIN I PNL SERPL HS: 6 NG/L (ref 0–20)
CARDIAC TROPONIN I PNL SERPL HS: 6 NG/L (ref 0–20)
CHLORIDE SERPL-SCNC: 103 MMOL/L (ref 98–107)
CO2 SERPL-SCNC: 22 MMOL/L (ref 21–32)
CREAT SERPL-MCNC: 1.59 MG/DL (ref 0.5–1.3)
DISPENSE STATUS: NORMAL
EGFRCR SERPLBLD CKD-EPI 2021: 45 ML/MIN/1.73M*2
EOSINOPHIL # BLD AUTO: 0.09 X10*3/UL (ref 0–0.4)
EOSINOPHIL NFR BLD AUTO: 1.5 %
ERYTHROCYTE [DISTWIDTH] IN BLOOD BY AUTOMATED COUNT: 19.1 % (ref 11.5–14.5)
FERRITIN SERPL-MCNC: 17 NG/ML (ref 20–300)
GLUCOSE SERPL-MCNC: 97 MG/DL (ref 74–99)
HCT VFR BLD AUTO: 26.6 % (ref 41–52)
HGB BLD-MCNC: 6.8 G/DL (ref 13.5–17.5)
IMM GRANULOCYTES # BLD AUTO: 0.03 X10*3/UL (ref 0–0.5)
IMM GRANULOCYTES NFR BLD AUTO: 0.5 % (ref 0–0.9)
INR PPP: 2 (ref 0.9–1.1)
IRON SATN MFR SERPL: 4 % (ref 25–45)
IRON SERPL-MCNC: 16 UG/DL (ref 35–150)
LYMPHOCYTES # BLD AUTO: 0.65 X10*3/UL (ref 0.8–3)
LYMPHOCYTES NFR BLD AUTO: 10.7 %
MAGNESIUM SERPL-MCNC: 2.13 MG/DL (ref 1.6–2.4)
MCH RBC QN AUTO: 18.2 PG (ref 26–34)
MCHC RBC AUTO-ENTMCNC: 25.6 G/DL (ref 32–36)
MCV RBC AUTO: 71 FL (ref 80–100)
MONOCYTES # BLD AUTO: 0.62 X10*3/UL (ref 0.05–0.8)
MONOCYTES NFR BLD AUTO: 10.2 %
NEUTROPHILS # BLD AUTO: 4.63 X10*3/UL (ref 1.6–5.5)
NEUTROPHILS NFR BLD AUTO: 76.4 %
NRBC BLD-RTO: 0 /100 WBCS (ref 0–0)
PLATELET # BLD AUTO: 237 X10*3/UL (ref 150–450)
POTASSIUM SERPL-SCNC: 3.5 MMOL/L (ref 3.5–5.3)
PRODUCT BLOOD TYPE: 6200
PRODUCT CODE: NORMAL
PROT SERPL-MCNC: 6.1 G/DL (ref 6.4–8.2)
PROTHROMBIN TIME: 22.6 SECONDS (ref 9.8–12.8)
RBC # BLD AUTO: 3.73 X10*6/UL (ref 4.5–5.9)
RH FACTOR (ANTIGEN D): NORMAL
SARS-COV-2 RNA RESP QL NAA+PROBE: NOT DETECTED
SODIUM SERPL-SCNC: 137 MMOL/L (ref 136–145)
TIBC SERPL-MCNC: 451 UG/DL (ref 240–445)
UIBC SERPL-MCNC: 435 UG/DL (ref 110–370)
UNIT ABO: NORMAL
UNIT NUMBER: NORMAL
UNIT RH: NORMAL
UNIT VOLUME: 350
WBC # BLD AUTO: 6.1 X10*3/UL (ref 4.4–11.3)
XM INTEP: NORMAL

## 2024-12-04 PROCEDURE — 99223 1ST HOSP IP/OBS HIGH 75: CPT

## 2024-12-04 PROCEDURE — 36415 COLL VENOUS BLD VENIPUNCTURE: CPT | Performed by: STUDENT IN AN ORGANIZED HEALTH CARE EDUCATION/TRAINING PROGRAM

## 2024-12-04 PROCEDURE — 2500000002 HC RX 250 W HCPCS SELF ADMINISTERED DRUGS (ALT 637 FOR MEDICARE OP, ALT 636 FOR OP/ED)

## 2024-12-04 PROCEDURE — 71045 X-RAY EXAM CHEST 1 VIEW: CPT

## 2024-12-04 PROCEDURE — 84484 ASSAY OF TROPONIN QUANT: CPT | Performed by: STUDENT IN AN ORGANIZED HEALTH CARE EDUCATION/TRAINING PROGRAM

## 2024-12-04 PROCEDURE — 83550 IRON BINDING TEST: CPT

## 2024-12-04 PROCEDURE — 83880 ASSAY OF NATRIURETIC PEPTIDE: CPT | Performed by: STUDENT IN AN ORGANIZED HEALTH CARE EDUCATION/TRAINING PROGRAM

## 2024-12-04 PROCEDURE — 96374 THER/PROPH/DIAG INJ IV PUSH: CPT

## 2024-12-04 PROCEDURE — 2500000004 HC RX 250 GENERAL PHARMACY W/ HCPCS (ALT 636 FOR OP/ED)

## 2024-12-04 PROCEDURE — 82728 ASSAY OF FERRITIN: CPT

## 2024-12-04 PROCEDURE — 86901 BLOOD TYPING SEROLOGIC RH(D): CPT | Performed by: STUDENT IN AN ORGANIZED HEALTH CARE EDUCATION/TRAINING PROGRAM

## 2024-12-04 PROCEDURE — 93005 ELECTROCARDIOGRAM TRACING: CPT

## 2024-12-04 PROCEDURE — 83735 ASSAY OF MAGNESIUM: CPT | Performed by: STUDENT IN AN ORGANIZED HEALTH CARE EDUCATION/TRAINING PROGRAM

## 2024-12-04 PROCEDURE — 99285 EMERGENCY DEPT VISIT HI MDM: CPT | Mod: 25 | Performed by: STUDENT IN AN ORGANIZED HEALTH CARE EDUCATION/TRAINING PROGRAM

## 2024-12-04 PROCEDURE — 85025 COMPLETE CBC W/AUTO DIFF WBC: CPT | Performed by: STUDENT IN AN ORGANIZED HEALTH CARE EDUCATION/TRAINING PROGRAM

## 2024-12-04 PROCEDURE — 83540 ASSAY OF IRON: CPT

## 2024-12-04 PROCEDURE — P9016 RBC LEUKOCYTES REDUCED: HCPCS

## 2024-12-04 PROCEDURE — 94640 AIRWAY INHALATION TREATMENT: CPT

## 2024-12-04 PROCEDURE — 1200000002 HC GENERAL ROOM WITH TELEMETRY DAILY

## 2024-12-04 PROCEDURE — 85610 PROTHROMBIN TIME: CPT | Performed by: STUDENT IN AN ORGANIZED HEALTH CARE EDUCATION/TRAINING PROGRAM

## 2024-12-04 PROCEDURE — 96375 TX/PRO/DX INJ NEW DRUG ADDON: CPT

## 2024-12-04 PROCEDURE — 80053 COMPREHEN METABOLIC PANEL: CPT | Performed by: STUDENT IN AN ORGANIZED HEALTH CARE EDUCATION/TRAINING PROGRAM

## 2024-12-04 PROCEDURE — 87635 SARS-COV-2 COVID-19 AMP PRB: CPT | Performed by: STUDENT IN AN ORGANIZED HEALTH CARE EDUCATION/TRAINING PROGRAM

## 2024-12-04 PROCEDURE — 93010 ELECTROCARDIOGRAM REPORT: CPT | Performed by: INTERNAL MEDICINE

## 2024-12-04 PROCEDURE — 86923 COMPATIBILITY TEST ELECTRIC: CPT

## 2024-12-04 PROCEDURE — 71045 X-RAY EXAM CHEST 1 VIEW: CPT | Performed by: RADIOLOGY

## 2024-12-04 PROCEDURE — 99285 EMERGENCY DEPT VISIT HI MDM: CPT | Performed by: STUDENT IN AN ORGANIZED HEALTH CARE EDUCATION/TRAINING PROGRAM

## 2024-12-04 PROCEDURE — 86850 RBC ANTIBODY SCREEN: CPT | Performed by: STUDENT IN AN ORGANIZED HEALTH CARE EDUCATION/TRAINING PROGRAM

## 2024-12-04 PROCEDURE — 36430 TRANSFUSION BLD/BLD COMPNT: CPT

## 2024-12-04 PROCEDURE — 93010 ELECTROCARDIOGRAM REPORT: CPT | Performed by: STUDENT IN AN ORGANIZED HEALTH CARE EDUCATION/TRAINING PROGRAM

## 2024-12-04 PROCEDURE — 2500000004 HC RX 250 GENERAL PHARMACY W/ HCPCS (ALT 636 FOR OP/ED): Performed by: STUDENT IN AN ORGANIZED HEALTH CARE EDUCATION/TRAINING PROGRAM

## 2024-12-04 RX ORDER — AZITHROMYCIN 500 MG/1
500 TABLET, FILM COATED ORAL
Status: COMPLETED | OUTPATIENT
Start: 2024-12-05 | End: 2024-12-07

## 2024-12-04 RX ORDER — PANTOPRAZOLE SODIUM 40 MG/10ML
40 INJECTION, POWDER, LYOPHILIZED, FOR SOLUTION INTRAVENOUS 2 TIMES DAILY
Status: DISCONTINUED | OUTPATIENT
Start: 2024-12-05 | End: 2024-12-07 | Stop reason: HOSPADM

## 2024-12-04 RX ORDER — ALBUTEROL SULFATE 0.83 MG/ML
2.5 SOLUTION RESPIRATORY (INHALATION) EVERY 4 HOURS PRN
Status: DISCONTINUED | OUTPATIENT
Start: 2024-12-04 | End: 2024-12-07 | Stop reason: HOSPADM

## 2024-12-04 RX ORDER — FUROSEMIDE 40 MG/1
40 TABLET ORAL DAILY
Status: ON HOLD | COMMUNITY
End: 2024-12-07

## 2024-12-04 RX ORDER — IPRATROPIUM BROMIDE AND ALBUTEROL SULFATE 2.5; .5 MG/3ML; MG/3ML
3 SOLUTION RESPIRATORY (INHALATION)
Status: DISCONTINUED | OUTPATIENT
Start: 2024-12-04 | End: 2024-12-05

## 2024-12-04 RX ORDER — FUROSEMIDE 10 MG/ML
40 INJECTION INTRAMUSCULAR; INTRAVENOUS ONCE
Status: COMPLETED | OUTPATIENT
Start: 2024-12-04 | End: 2024-12-04

## 2024-12-04 RX ORDER — POTASSIUM CHLORIDE 20 MEQ/1
40 TABLET, EXTENDED RELEASE ORAL ONCE
Status: COMPLETED | OUTPATIENT
Start: 2024-12-04 | End: 2024-12-04

## 2024-12-04 RX ORDER — PANTOPRAZOLE SODIUM 40 MG/10ML
80 INJECTION, POWDER, LYOPHILIZED, FOR SOLUTION INTRAVENOUS ONCE
Status: COMPLETED | OUTPATIENT
Start: 2024-12-04 | End: 2024-12-04

## 2024-12-04 RX ADMIN — IPRATROPIUM BROMIDE AND ALBUTEROL SULFATE 3 ML: 2.5; .5 SOLUTION RESPIRATORY (INHALATION) at 21:52

## 2024-12-04 RX ADMIN — FUROSEMIDE 40 MG: 10 INJECTION, SOLUTION INTRAMUSCULAR; INTRAVENOUS at 21:52

## 2024-12-04 RX ADMIN — METHYLPREDNISOLONE SODIUM SUCCINATE 40 MG: 40 INJECTION, POWDER, FOR SOLUTION INTRAMUSCULAR; INTRAVENOUS at 21:52

## 2024-12-04 RX ADMIN — POTASSIUM CHLORIDE 40 MEQ: 1500 TABLET, EXTENDED RELEASE ORAL at 23:41

## 2024-12-04 RX ADMIN — ALBUTEROL SULFATE 2.5 MG: 2.5 SOLUTION RESPIRATORY (INHALATION) at 22:15

## 2024-12-04 RX ADMIN — PANTOPRAZOLE SODIUM 80 MG: 40 INJECTION, POWDER, FOR SOLUTION INTRAVENOUS at 18:32

## 2024-12-04 SDOH — SOCIAL STABILITY: SOCIAL INSECURITY
WITHIN THE LAST YEAR, HAVE YOU BEEN KICKED, HIT, SLAPPED, OR OTHERWISE PHYSICALLY HURT BY YOUR PARTNER OR EX-PARTNER?: NO

## 2024-12-04 SDOH — SOCIAL STABILITY: SOCIAL INSECURITY: DO YOU FEEL UNSAFE GOING BACK TO THE PLACE WHERE YOU ARE LIVING?: NO

## 2024-12-04 SDOH — ECONOMIC STABILITY: FOOD INSECURITY: WITHIN THE PAST 12 MONTHS, THE FOOD YOU BOUGHT JUST DIDN'T LAST AND YOU DIDN'T HAVE MONEY TO GET MORE.: NEVER TRUE

## 2024-12-04 SDOH — SOCIAL STABILITY: SOCIAL INSECURITY: ARE YOU OR HAVE YOU BEEN THREATENED OR ABUSED PHYSICALLY, EMOTIONALLY, OR SEXUALLY BY ANYONE?: NO

## 2024-12-04 SDOH — ECONOMIC STABILITY: INCOME INSECURITY: IN THE PAST 12 MONTHS HAS THE ELECTRIC, GAS, OIL, OR WATER COMPANY THREATENED TO SHUT OFF SERVICES IN YOUR HOME?: NO

## 2024-12-04 SDOH — SOCIAL STABILITY: SOCIAL INSECURITY: WITHIN THE LAST YEAR, HAVE YOU BEEN HUMILIATED OR EMOTIONALLY ABUSED IN OTHER WAYS BY YOUR PARTNER OR EX-PARTNER?: NO

## 2024-12-04 SDOH — ECONOMIC STABILITY: FOOD INSECURITY: WITHIN THE PAST 12 MONTHS, YOU WORRIED THAT YOUR FOOD WOULD RUN OUT BEFORE YOU GOT THE MONEY TO BUY MORE.: NEVER TRUE

## 2024-12-04 SDOH — SOCIAL STABILITY: SOCIAL INSECURITY: WERE YOU ABLE TO COMPLETE ALL THE BEHAVIORAL HEALTH SCREENINGS?: YES

## 2024-12-04 SDOH — SOCIAL STABILITY: SOCIAL INSECURITY: DOES ANYONE TRY TO KEEP YOU FROM HAVING/CONTACTING OTHER FRIENDS OR DOING THINGS OUTSIDE YOUR HOME?: NO

## 2024-12-04 SDOH — SOCIAL STABILITY: SOCIAL INSECURITY
WITHIN THE LAST YEAR, HAVE YOU BEEN RAPED OR FORCED TO HAVE ANY KIND OF SEXUAL ACTIVITY BY YOUR PARTNER OR EX-PARTNER?: NO

## 2024-12-04 SDOH — SOCIAL STABILITY: SOCIAL INSECURITY: HAVE YOU HAD ANY THOUGHTS OF HARMING ANYONE ELSE?: NO

## 2024-12-04 SDOH — SOCIAL STABILITY: SOCIAL INSECURITY: WITHIN THE LAST YEAR, HAVE YOU BEEN AFRAID OF YOUR PARTNER OR EX-PARTNER?: NO

## 2024-12-04 SDOH — SOCIAL STABILITY: SOCIAL INSECURITY: DO YOU FEEL ANYONE HAS EXPLOITED OR TAKEN ADVANTAGE OF YOU FINANCIALLY OR OF YOUR PERSONAL PROPERTY?: NO

## 2024-12-04 SDOH — SOCIAL STABILITY: SOCIAL INSECURITY: HAS ANYONE EVER THREATENED TO HURT YOUR FAMILY OR YOUR PETS?: NO

## 2024-12-04 SDOH — SOCIAL STABILITY: SOCIAL INSECURITY: ABUSE: ADULT

## 2024-12-04 SDOH — SOCIAL STABILITY: SOCIAL INSECURITY: HAVE YOU HAD THOUGHTS OF HARMING ANYONE ELSE?: NO

## 2024-12-04 SDOH — SOCIAL STABILITY: SOCIAL INSECURITY: ARE THERE ANY APPARENT SIGNS OF INJURIES/BEHAVIORS THAT COULD BE RELATED TO ABUSE/NEGLECT?: NO

## 2024-12-04 ASSESSMENT — COGNITIVE AND FUNCTIONAL STATUS - GENERAL
CLIMB 3 TO 5 STEPS WITH RAILING: A LITTLE
PATIENT BASELINE BEDBOUND: NO
MOVING TO AND FROM BED TO CHAIR: A LITTLE
WALKING IN HOSPITAL ROOM: A LITTLE
TURNING FROM BACK TO SIDE WHILE IN FLAT BAD: A LITTLE
DRESSING REGULAR UPPER BODY CLOTHING: A LITTLE
HELP NEEDED FOR BATHING: A LITTLE
MOBILITY SCORE: 19
MOVING TO AND FROM BED TO CHAIR: A LITTLE
DAILY ACTIVITIY SCORE: 20
TOILETING: A LITTLE
DAILY ACTIVITIY SCORE: 20
HELP NEEDED FOR BATHING: A LITTLE
MOBILITY SCORE: 20
STANDING UP FROM CHAIR USING ARMS: A LITTLE
WALKING IN HOSPITAL ROOM: A LITTLE
DRESSING REGULAR UPPER BODY CLOTHING: A LITTLE
DRESSING REGULAR LOWER BODY CLOTHING: A LITTLE
TOILETING: A LITTLE
STANDING UP FROM CHAIR USING ARMS: A LITTLE
DRESSING REGULAR LOWER BODY CLOTHING: A LITTLE
CLIMB 3 TO 5 STEPS WITH RAILING: A LITTLE

## 2024-12-04 ASSESSMENT — LIFESTYLE VARIABLES
HOW MANY STANDARD DRINKS CONTAINING ALCOHOL DO YOU HAVE ON A TYPICAL DAY: PATIENT DOES NOT DRINK
AUDIT-C TOTAL SCORE: 0
HAVE YOU EVER FELT YOU SHOULD CUT DOWN ON YOUR DRINKING: NO
HOW OFTEN DO YOU HAVE 6 OR MORE DRINKS ON ONE OCCASION: NEVER
AUDIT-C TOTAL SCORE: 0
HOW OFTEN DO YOU HAVE A DRINK CONTAINING ALCOHOL: NEVER
TOTAL SCORE: 0
SKIP TO QUESTIONS 9-10: 1
HAVE PEOPLE ANNOYED YOU BY CRITICIZING YOUR DRINKING: NO
EVER HAD A DRINK FIRST THING IN THE MORNING TO STEADY YOUR NERVES TO GET RID OF A HANGOVER: NO
EVER FELT BAD OR GUILTY ABOUT YOUR DRINKING: NO

## 2024-12-04 ASSESSMENT — ACTIVITIES OF DAILY LIVING (ADL)
HEARING - LEFT EAR: FUNCTIONAL
ADEQUATE_TO_COMPLETE_ADL: YES
BATHING: NEEDS ASSISTANCE
ASSISTIVE_DEVICE: WALKER
LACK_OF_TRANSPORTATION: NO
JUDGMENT_ADEQUATE_SAFELY_COMPLETE_DAILY_ACTIVITIES: YES
GROOMING: INDEPENDENT
FEEDING YOURSELF: INDEPENDENT
WALKS IN HOME: NEEDS ASSISTANCE
TOILETING: NEEDS ASSISTANCE
HEARING - RIGHT EAR: FUNCTIONAL
PATIENT'S MEMORY ADEQUATE TO SAFELY COMPLETE DAILY ACTIVITIES?: YES
DRESSING YOURSELF: NEEDS ASSISTANCE

## 2024-12-04 ASSESSMENT — PAIN DESCRIPTION - PROGRESSION: CLINICAL_PROGRESSION: NOT CHANGED

## 2024-12-04 ASSESSMENT — COLUMBIA-SUICIDE SEVERITY RATING SCALE - C-SSRS
1. IN THE PAST MONTH, HAVE YOU WISHED YOU WERE DEAD OR WISHED YOU COULD GO TO SLEEP AND NOT WAKE UP?: NO
2. HAVE YOU ACTUALLY HAD ANY THOUGHTS OF KILLING YOURSELF?: NO
6. HAVE YOU EVER DONE ANYTHING, STARTED TO DO ANYTHING, OR PREPARED TO DO ANYTHING TO END YOUR LIFE?: NO

## 2024-12-04 ASSESSMENT — PAIN - FUNCTIONAL ASSESSMENT: PAIN_FUNCTIONAL_ASSESSMENT: 0-10

## 2024-12-04 ASSESSMENT — PATIENT HEALTH QUESTIONNAIRE - PHQ9
1. LITTLE INTEREST OR PLEASURE IN DOING THINGS: NOT AT ALL
2. FEELING DOWN, DEPRESSED OR HOPELESS: NOT AT ALL
SUM OF ALL RESPONSES TO PHQ9 QUESTIONS 1 & 2: 0

## 2024-12-04 ASSESSMENT — PAIN SCALES - GENERAL: PAINLEVEL_OUTOF10: 1

## 2024-12-04 NOTE — ED PROVIDER NOTES
Emergency Department Provider Note        History of Present Illness     History provided by: Patient  Limitations to History: None  External Records Reviewed with Brief Summary: [facility] ([facility])    HPI:  Reyes Cornelius is a 74 y.o. male with a history of A-fib on Eliquis, CAD s/p stents, HTN, BPH, and gastric AVMs presents with a chief complaint of acute anemia with hemoglobin of 6.5.  Patient endorses shortness of breath worse with standing for the past 2 weeks and feeling tired. He has a history of low iron levels since 2016 and required a blood transfusion and iron infusions that year. The [facility] showed a hemoglobin level of 6.5, iron level of 11, TIBC of 3.4, and hematocrit of 24.    The patient has a history of atrial fibrillation and takes Eliquis twice a day for clot prevention. He had stents placed in his heart for a LAD occlusion in 2011 and has a history of an aneurysm, but a recent CT scan was unremarkable. He denies any recent changes in medications, with the last addition being Jardiance at the end of July. The patient denies having diabetes, COPD, or asthma. He reports no recent red or black stool and no leg swelling beyond his usual venous insufficiency and lymphedema.    The patient quit smoking in 2009 and does not consume alcohol or use illicit drugs. He has allergies to red dye for scans, tetracycline, rivaroxaban, and contrast dye, with Xarelto being a question kajal. His current medications include metoprolol, tamsulosin, finasteride, eplerenone, dapagliflozin, atorvastatin, aspirin, allopurinol, and tramadol.    Physical Exam   Triage vitals:  T 36.7 °C (98.1 °F)    /63  RR (!) 34  O2 (!) 80 % Supplemental oxygen    General: Awake, alert, in no acute distress  Eyes: Gaze conjugate.  No scleral icterus or injection  HENT: Normo-cephalic, atraumatic. No stridor  CV: Regular rate, regular rhythm. Radial pulses 2+  bilaterally  Resp: Breathing non-labored, speaking in full sentences.  Clear to auscultation bilaterally  GI: Soft, non-distended, non-tender. No rebound or guarding.  MSK/Extremities: No gross bony deformities. Moving all extremities  Skin: Warm. Appropriate color  Neuro: Alert. Oriented. Face symmetric. Speech is fluent.  Gross strength and sensation intact in b/l UE and LEs  Psych: Appropriate mood and affect    Medical Decision Making & ED Course   Medical Decision Makin y.o. male evaluated for acute anemia (Hemoglobin 6.5) and shortness of breath, with a history of venous insufficiency, lymphedema, and atrial fibrillation. The patient is currently on multiple medications including Eliquis for anticoagulation, and has allergies to several substances including red dye for scans, tetracycline, rivaroxaban, and contrast dye.    Based on the patient's presentation and history, the primary concerns are the severe anemia requiring immediate attention, shortness of breath which may be related to the anemia or have other underlying causes, and the management of chronic conditions while addressing these acute issues.    Plan includes obtaining a type and screen for potential blood transfusion, with consent to be obtained. Hemoglobin levels will be monitored and transfused as needed. Investigation into the cause of anemia, such as GI bleed or nutritional deficiency, will be initiated. For shortness of breath, oxygen saturation will be monitored and response to supplemental oxygen assessed, with consideration for further evaluation of underlying causes.    Eliquis will be held due to the anemia, with reassessment of anticoagulation needs after addressing the anemia. Compression therapy for venous insufficiency and lymphedema will continue as tolerated.   ----  Social Determinants of Health which Significantly Impact Care: None identified     EKG Independent Interpretation: EKG interpreted by myself. Please see ED  Course for full interpretation.    Independent Result Review and Interpretation: Relevant laboratory and radiographic results were reviewed and independently interpreted by myself.  As necessary, they are commented on in the ED Course.    Chronic conditions affecting the patient's care: As documented above in [facility]    The patient was discussed with the following consultants/services: Hospitalist/Admitting Provider who accepted the patient for admission    Care Considerations: As documented above in [facility]    ED Course:  ED Course as of 12/04/24 2047   Wed Dec 04, 2024   1725 EKG performed at 17: 22 and independently reviewed by provider: Reveals atrial fibrillation with a rate of 90 bpm, leftward axis, normal intervals, no ST changes, nonspecific T wave flattening, no ectopy. No STEMI.  Overall low voltage [TL]   1826 Given history of AVM, use of anticoagulation at this time as well as worsening microcytic anemia I am concerned for GI bleeding.  80 mg Protonix to be given as well as 1 unit of PRBC.  Patient consented by resident. [TL]   1835 IMPRESSION:  1.  Hyperinflation with features suggesting COPD bronchial thickening  detected. There may be concomitant pulmonary edema noted. Follow-up  is advised.  Extensive vascular calcification.   [TL]   1906 HEMOGLOBIN(!): 6.8  Patient consented for blood transfusion.  Will be transfused 1 unit PRBC. [ES]   1943 Labs unimpressive for leukocytosis, STEPHANIE (Cr baseline 1.5), hepatitis, acidosis, ACS, or electrolyte abnormality. [ES]   2020 EKG at 1722. Ventricular Rate: 90. Rhythm: Sinus. LAD. No acute ischemic changes.  QRS 98 ms.  QTc: not prolonged at 455 ms.  [ES]   2045 [facility] to [facility] without [facility] call.  Patient admitted to current hospital medicine service concerning for patient's history of A-fib on Eliquis and gastric AVMs in the setting of anemia. [ES]      ED Course User Index  [ES] Lenny Mendoza MD  [TL] Williams Hatch, DO     Disposition   As a result of  their workup, the patient will require admission to the hospital.  The patient was informed of his diagnosis.  The patient was given the opportunity to ask questions and I answered them. The patient agreed to be admitted to the hospital.    Procedures   Procedures    This was a shared visit with an ED attending.  The patient was seen and discussed with the ED attending    Lenny Mendoza MD  Emergency Medicine, PGY3     Lenny Mendoza MD  Resident  12/04/24 5520

## 2024-12-05 ENCOUNTER — APPOINTMENT (OUTPATIENT)
Dept: CARDIOLOGY | Facility: HOSPITAL | Age: 74
End: 2024-12-05
Payer: MEDICARE

## 2024-12-05 LAB
ALBUMIN SERPL BCP-MCNC: 3.4 G/DL (ref 3.4–5)
ANION GAP SERPL CALC-SCNC: 13 MMOL/L (ref 10–20)
AORTIC VALVE MEAN GRADIENT: 7 MMHG
AORTIC VALVE PEAK VELOCITY: 1.89 M/S
AV PEAK GRADIENT: 14 MMHG
AVA (PEAK VEL): 1.91 CM2
AVA (VTI): 2.23 CM2
BUN SERPL-MCNC: 31 MG/DL (ref 6–23)
CALCIUM SERPL-MCNC: 8.6 MG/DL (ref 8.6–10.3)
CHLORIDE SERPL-SCNC: 107 MMOL/L (ref 98–107)
CO2 SERPL-SCNC: 23 MMOL/L (ref 21–32)
CREAT SERPL-MCNC: 1.48 MG/DL (ref 0.5–1.3)
EGFRCR SERPLBLD CKD-EPI 2021: 49 ML/MIN/1.73M*2
EJECTION FRACTION APICAL 4 CHAMBER: 60.9
EJECTION FRACTION: 54 %
ERYTHROCYTE [DISTWIDTH] IN BLOOD BY AUTOMATED COUNT: 18.9 % (ref 11.5–14.5)
ERYTHROCYTE [DISTWIDTH] IN BLOOD BY AUTOMATED COUNT: 19.1 % (ref 11.5–14.5)
GLUCOSE SERPL-MCNC: 148 MG/DL (ref 74–99)
HCT VFR BLD AUTO: 25.9 % (ref 41–52)
HCT VFR BLD AUTO: 29.1 % (ref 41–52)
HEMOCCULT SP1 STL QL: POSITIVE
HGB BLD-MCNC: 6.5 G/DL (ref 13.5–17.5)
HGB BLD-MCNC: 7.7 G/DL (ref 13.5–17.5)
INR PPP: 1.6 (ref 0.9–1.1)
LEFT ATRIUM VOLUME AREA LENGTH INDEX BSA: 53.9 ML/M2
LEFT VENTRICLE INTERNAL DIMENSION DIASTOLE: 5.35 CM (ref 3.5–6)
LEFT VENTRICULAR OUTFLOW TRACT DIAMETER: 2.21 CM
LV EJECTION FRACTION BIPLANE: 54 %
MAGNESIUM SERPL-MCNC: 2.03 MG/DL (ref 1.6–2.4)
MCH RBC QN AUTO: 18.3 PG (ref 26–34)
MCH RBC QN AUTO: 19.4 PG (ref 26–34)
MCHC RBC AUTO-ENTMCNC: 25.1 G/DL (ref 32–36)
MCHC RBC AUTO-ENTMCNC: 26.5 G/DL (ref 32–36)
MCV RBC AUTO: 73 FL (ref 80–100)
MCV RBC AUTO: 73 FL (ref 80–100)
NRBC BLD-RTO: 0 /100 WBCS (ref 0–0)
NRBC BLD-RTO: 0 /100 WBCS (ref 0–0)
PHOSPHATE SERPL-MCNC: 4.2 MG/DL (ref 2.5–4.9)
PLATELET # BLD AUTO: 187 X10*3/UL (ref 150–450)
PLATELET # BLD AUTO: 200 X10*3/UL (ref 150–450)
POTASSIUM SERPL-SCNC: 3.8 MMOL/L (ref 3.5–5.3)
PROTHROMBIN TIME: 18.6 SECONDS (ref 9.8–12.8)
Q ONSET: 209 MS
Q ONSET: 228 MS
QRS COUNT: 14 BEATS
QRS COUNT: 15 BEATS
QRS DURATION: 96 MS
QRS DURATION: 98 MS
QT INTERVAL: 372 MS
QT INTERVAL: 376 MS
QTC CALCULATION(BAZETT): 447 MS
QTC CALCULATION(BAZETT): 455 MS
QTC FREDERICIA: 422 MS
QTC FREDERICIA: 425 MS
R AXIS: -74 DEGREES
R AXIS: -77 DEGREES
RBC # BLD AUTO: 3.55 X10*6/UL (ref 4.5–5.9)
RBC # BLD AUTO: 3.97 X10*6/UL (ref 4.5–5.9)
RIGHT VENTRICLE FREE WALL PEAK S': 10 CM/S
RIGHT VENTRICLE PEAK SYSTOLIC PRESSURE: 53.3 MMHG
SODIUM SERPL-SCNC: 139 MMOL/L (ref 136–145)
T AXIS: 14 DEGREES
T AXIS: 6 DEGREES
T OFFSET: 395 MS
T OFFSET: 416 MS
TRICUSPID ANNULAR PLANE SYSTOLIC EXCURSION: 1.9 CM
VENTRICULAR RATE: 85 BPM
VENTRICULAR RATE: 90 BPM
WBC # BLD AUTO: 4.1 X10*3/UL (ref 4.4–11.3)
WBC # BLD AUTO: 4.6 X10*3/UL (ref 4.4–11.3)

## 2024-12-05 PROCEDURE — 85027 COMPLETE CBC AUTOMATED: CPT

## 2024-12-05 PROCEDURE — 2500000004 HC RX 250 GENERAL PHARMACY W/ HCPCS (ALT 636 FOR OP/ED)

## 2024-12-05 PROCEDURE — 82270 OCCULT BLOOD FECES: CPT

## 2024-12-05 PROCEDURE — 80069 RENAL FUNCTION PANEL: CPT

## 2024-12-05 PROCEDURE — 2500000002 HC RX 250 W HCPCS SELF ADMINISTERED DRUGS (ALT 637 FOR MEDICARE OP, ALT 636 FOR OP/ED)

## 2024-12-05 PROCEDURE — 36415 COLL VENOUS BLD VENIPUNCTURE: CPT

## 2024-12-05 PROCEDURE — 94640 AIRWAY INHALATION TREATMENT: CPT

## 2024-12-05 PROCEDURE — 2500000001 HC RX 250 WO HCPCS SELF ADMINISTERED DRUGS (ALT 637 FOR MEDICARE OP): Performed by: NURSE PRACTITIONER

## 2024-12-05 PROCEDURE — 99222 1ST HOSP IP/OBS MODERATE 55: CPT | Performed by: INTERNAL MEDICINE

## 2024-12-05 PROCEDURE — 85610 PROTHROMBIN TIME: CPT

## 2024-12-05 PROCEDURE — 36430 TRANSFUSION BLD/BLD COMPNT: CPT

## 2024-12-05 PROCEDURE — 2500000001 HC RX 250 WO HCPCS SELF ADMINISTERED DRUGS (ALT 637 FOR MEDICARE OP)

## 2024-12-05 PROCEDURE — 99222 1ST HOSP IP/OBS MODERATE 55: CPT | Performed by: NURSE PRACTITIONER

## 2024-12-05 PROCEDURE — 2500000005 HC RX 250 GENERAL PHARMACY W/O HCPCS

## 2024-12-05 PROCEDURE — 93306 TTE W/DOPPLER COMPLETE: CPT

## 2024-12-05 PROCEDURE — P9016 RBC LEUKOCYTES REDUCED: HCPCS

## 2024-12-05 PROCEDURE — 83735 ASSAY OF MAGNESIUM: CPT

## 2024-12-05 PROCEDURE — 99233 SBSQ HOSP IP/OBS HIGH 50: CPT

## 2024-12-05 PROCEDURE — 1200000002 HC GENERAL ROOM WITH TELEMETRY DAILY

## 2024-12-05 PROCEDURE — 94660 CPAP INITIATION&MGMT: CPT

## 2024-12-05 PROCEDURE — 2500000004 HC RX 250 GENERAL PHARMACY W/ HCPCS (ALT 636 FOR OP/ED): Performed by: STUDENT IN AN ORGANIZED HEALTH CARE EDUCATION/TRAINING PROGRAM

## 2024-12-05 RX ORDER — SODIUM CHLORIDE 9 MG/ML
INJECTION, SOLUTION INTRAMUSCULAR; INTRAVENOUS; SUBCUTANEOUS
Status: COMPLETED
Start: 2024-12-05 | End: 2024-12-05

## 2024-12-05 RX ORDER — POLYETHYLENE GLYCOL 3350, SODIUM CHLORIDE, SODIUM BICARBONATE, POTASSIUM CHLORIDE 420; 11.2; 5.72; 1.48 G/4L; G/4L; G/4L; G/4L
4000 POWDER, FOR SOLUTION ORAL ONCE
Status: COMPLETED | OUTPATIENT
Start: 2024-12-05 | End: 2024-12-05

## 2024-12-05 RX ORDER — FINASTERIDE 5 MG/1
5 TABLET, FILM COATED ORAL DAILY
Status: DISCONTINUED | OUTPATIENT
Start: 2024-12-05 | End: 2024-12-07 | Stop reason: HOSPADM

## 2024-12-05 RX ORDER — ALLOPURINOL 300 MG/1
300 TABLET ORAL DAILY
Status: DISCONTINUED | OUTPATIENT
Start: 2024-12-05 | End: 2024-12-07 | Stop reason: HOSPADM

## 2024-12-05 RX ORDER — CHOLECALCIFEROL (VITAMIN D3) 25 MCG
1000 TABLET ORAL DAILY
Status: DISCONTINUED | OUTPATIENT
Start: 2024-12-05 | End: 2024-12-07 | Stop reason: HOSPADM

## 2024-12-05 RX ORDER — IPRATROPIUM BROMIDE AND ALBUTEROL SULFATE 2.5; .5 MG/3ML; MG/3ML
3 SOLUTION RESPIRATORY (INHALATION)
Status: DISCONTINUED | OUTPATIENT
Start: 2024-12-06 | End: 2024-12-07 | Stop reason: HOSPADM

## 2024-12-05 RX ORDER — EPLERENONE 25 MG/1
25 TABLET, FILM COATED ORAL DAILY
Status: DISCONTINUED | OUTPATIENT
Start: 2024-12-05 | End: 2024-12-07 | Stop reason: HOSPADM

## 2024-12-05 RX ORDER — TAMSULOSIN HYDROCHLORIDE 0.4 MG/1
0.8 CAPSULE ORAL NIGHTLY
Status: DISCONTINUED | OUTPATIENT
Start: 2024-12-05 | End: 2024-12-07 | Stop reason: HOSPADM

## 2024-12-05 RX ORDER — FUROSEMIDE 10 MG/ML
40 INJECTION INTRAMUSCULAR; INTRAVENOUS ONCE
Status: COMPLETED | OUTPATIENT
Start: 2024-12-05 | End: 2024-12-05

## 2024-12-05 RX ORDER — BISACODYL 5 MG
10 TABLET, DELAYED RELEASE (ENTERIC COATED) ORAL EVERY 8 HOURS
Status: COMPLETED | OUTPATIENT
Start: 2024-12-05 | End: 2024-12-05

## 2024-12-05 RX ORDER — ATORVASTATIN CALCIUM 80 MG/1
80 TABLET, FILM COATED ORAL NIGHTLY
Status: DISCONTINUED | OUTPATIENT
Start: 2024-12-05 | End: 2024-12-07 | Stop reason: HOSPADM

## 2024-12-05 RX ORDER — FUROSEMIDE 40 MG/1
40 TABLET ORAL DAILY
Status: DISCONTINUED | OUTPATIENT
Start: 2024-12-05 | End: 2024-12-07 | Stop reason: HOSPADM

## 2024-12-05 RX ORDER — ASCORBIC ACID 500 MG
500 TABLET ORAL DAILY
Status: DISCONTINUED | OUTPATIENT
Start: 2024-12-05 | End: 2024-12-07 | Stop reason: HOSPADM

## 2024-12-05 RX ORDER — FLUTICASONE PROPIONATE 50 MCG
1 SPRAY, SUSPENSION (ML) NASAL DAILY PRN
Status: DISCONTINUED | OUTPATIENT
Start: 2024-12-05 | End: 2024-12-07 | Stop reason: HOSPADM

## 2024-12-05 RX ORDER — METOPROLOL SUCCINATE 25 MG/1
25 TABLET, EXTENDED RELEASE ORAL DAILY
Status: DISCONTINUED | OUTPATIENT
Start: 2024-12-05 | End: 2024-12-07 | Stop reason: HOSPADM

## 2024-12-05 RX ADMIN — Medication 1000 UNITS: at 14:31

## 2024-12-05 RX ADMIN — OXYCODONE HYDROCHLORIDE AND ACETAMINOPHEN 500 MG: 500 TABLET ORAL at 14:31

## 2024-12-05 RX ADMIN — IPRATROPIUM BROMIDE AND ALBUTEROL SULFATE 3 ML: 2.5; .5 SOLUTION RESPIRATORY (INHALATION) at 08:08

## 2024-12-05 RX ADMIN — EMPAGLIFLOZIN 25 MG: 25 TABLET, FILM COATED ORAL at 14:30

## 2024-12-05 RX ADMIN — PANTOPRAZOLE SODIUM 40 MG: 40 INJECTION, POWDER, FOR SOLUTION INTRAVENOUS at 20:52

## 2024-12-05 RX ADMIN — BISACODYL 10 MG: 5 TABLET, COATED ORAL at 21:48

## 2024-12-05 RX ADMIN — TAMSULOSIN HYDROCHLORIDE 0.8 MG: 0.4 CAPSULE ORAL at 20:54

## 2024-12-05 RX ADMIN — SODIUM CHLORIDE 10 ML: 9 INJECTION, SOLUTION INTRAMUSCULAR; INTRAVENOUS; SUBCUTANEOUS at 20:52

## 2024-12-05 RX ADMIN — AZITHROMYCIN DIHYDRATE 500 MG: 500 TABLET ORAL at 08:27

## 2024-12-05 RX ADMIN — ALLOPURINOL 300 MG: 300 TABLET ORAL at 14:31

## 2024-12-05 RX ADMIN — IRON SUCROSE 300 MG: 20 INJECTION, SOLUTION INTRAVENOUS at 14:32

## 2024-12-05 RX ADMIN — FINASTERIDE 5 MG: 5 TABLET, FILM COATED ORAL at 14:30

## 2024-12-05 RX ADMIN — PANTOPRAZOLE SODIUM 40 MG: 40 INJECTION, POWDER, FOR SOLUTION INTRAVENOUS at 08:27

## 2024-12-05 RX ADMIN — IPRATROPIUM BROMIDE AND ALBUTEROL SULFATE 3 ML: 2.5; .5 SOLUTION RESPIRATORY (INHALATION) at 20:34

## 2024-12-05 RX ADMIN — IPRATROPIUM BROMIDE AND ALBUTEROL SULFATE 3 ML: 2.5; .5 SOLUTION RESPIRATORY (INHALATION) at 00:21

## 2024-12-05 RX ADMIN — ATORVASTATIN CALCIUM 80 MG: 80 TABLET, FILM COATED ORAL at 20:54

## 2024-12-05 RX ADMIN — FUROSEMIDE 40 MG: 10 INJECTION, SOLUTION INTRAMUSCULAR; INTRAVENOUS at 14:31

## 2024-12-05 RX ADMIN — BISACODYL 10 MG: 5 TABLET, COATED ORAL at 14:31

## 2024-12-05 RX ADMIN — POLYETHYLENE GLYCOL 3350, SODIUM SULFATE ANHYDROUS, SODIUM BICARBONATE, SODIUM CHLORIDE, POTASSIUM CHLORIDE 4000 ML: 236; 22.74; 6.74; 5.86; 2.97 POWDER, FOR SOLUTION ORAL at 16:08

## 2024-12-05 RX ADMIN — Medication 4 L/MIN: at 17:07

## 2024-12-05 RX ADMIN — EPLERENONE 25 MG: 25 TABLET, FILM COATED ORAL at 14:32

## 2024-12-05 ASSESSMENT — PAIN SCALES - GENERAL
PAINLEVEL_OUTOF10: 0 - NO PAIN
PAINLEVEL_OUTOF10: 0 - NO PAIN

## 2024-12-05 ASSESSMENT — PAIN - FUNCTIONAL ASSESSMENT: PAIN_FUNCTIONAL_ASSESSMENT: 0-10

## 2024-12-05 NOTE — NURSING NOTE
SHIFT NOTE  2240 Patient arrived to [facility]. Vitals stable other than tachypnea. SpO2 98% on 4L NC. Pt is pursed-lipped breathing and pausing mid sentence to catch his breath. RT was called for breathing tx and CPAP for PM. Pt knows his home settings at 13.  Dyspnea dissipated.   Plan for echo today. Pt has been NPO since midnight for GI consult and possible scope. Blood thinner on hold. Tele showed A-fib.   Safety maintained.

## 2024-12-05 NOTE — H&P
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  This also coincided with progressively worsening generalized weakness.  Denies fever, chills, night sweats, chest pain, palpitations, or abdominal pain.  Early this year he was admitted for upper GI bleed secondary to AVMs, he is scheduled outpatient in a week for colonoscopy, EGD and from what he says a pill endoscopy.  He denies any overt signs of bleeding including hematochezia, melena, hemoptysis or hematemesis.      In the ED he was afebrile, mildly tacky in the 100s, tachypneic, HDS but SpO2 of 80% on RA.  CBC showed worsening microcytic anemia with Hgb 6.8 (baseline 8-9). INR 2.0 CMP significant for hypokalemia of 3.5 serum creatinine 1.59 (baseline 1.7) BUN 32 iron studies consistent with CARMITA.  Troponin x 2 WNL.     he was put on supplemental oxygen, consented for blood and received 1 unit PRBCs in the ED.  Admitted for acute hypoxic respiratory failure.    Past Medical History  He has a past medical history of A-fib (Multi) (02/28/2024), AAA (abdominal aortic aneurysm) (CMS-HCC) (02/28/2024), Anemia, unspecified (02/27/2024), Aneurysm artery, iliac (CMS-[facility]) (02/28/2024), Back pain (02/28/2024), BPH (benign prostatic hyperplasia) (02/28/2024), CAD (coronary artery disease) (02/28/2024), CKD (chronic kidney disease), stage III (Multi) (02/28/2024), Essential hypertension (02/28/2024), GI bleed (02/28/2024), Gout (02/28/2024), Hernia, abdominal, HLD (hyperlipidemia) (02/28/2024), CARMITA (iron deficiency anemia), Lymphedema (02/28/2024), Morbid obesity (Multi) (02/28/2024), Obstructive sleep apnea, adult (02/28/2024),  Osteoarthritis (02/28/2024), and Pulmonary hypertension (Multi) (02/28/2024).    Surgical History  He has a past surgical history that includes Total knee arthroplasty (Bilateral); Coronary stent placement; IR angiogram endovascular aortic repair; Adenoidectomy; and Tonsillectomy.     Social History  He reports that he quit smoking about 15 years ago. His smoking use included cigarettes. He has quit using smokeless tobacco. He reports that he does not currently use alcohol. He reports that he does not use drugs.    Family History  Family History   Problem Relation Name Age of Onset    Cataracts Mother      Throat cancer Father      Cataracts Father      Cancer Brother      Heart disease Other      Glaucoma Other      Hypertension Other      Diabetes Other          Allergies  Iodinated contrast media, Povidone-iodine, Tetracycline, Vancomycin, Warfarin, and Xarelto [rivaroxaban]    Review of Systems   12 pt ROS obtained: positives & pertinent negatives listed in HPI     Physical Exam  Constitutional: A&Ox4, NAD, increased work of breathing, toxic appearing  Head and Face: Atraumatic, normocephalic  Eyes: Normal external exam, EOMI  ENT: Normal external inspection of ears and nose. Oropharynx normal.  Cardiovascular: RRR, S1/S2, no murmurs, rubs, or gallops, radial pulses +2  Pulmonary: decreased breath sounds B/L, no respiratory distress, no wheezing, rales or rhonchi, pursed lip breathing, conversational dyspnea, no accessory muscle use  Abdomen: +BS, soft, non-tender, nondistended, no guarding rigidity or rebound tenderness, no masses noted  MSK: Anasarca noted up to hips, compression stockings on, No joint swelling, normal movements of all extremities.   Neuro: No focal deficits, normal motor function, normal sensation, follows all commands  Skin- No lesions, contusions, or erythema. Multiple telangectasia scattered on face  Psychiatric: Judgment intact. Appropriate mood, affect and behavior    Last Recorded  Vitals  /56   Pulse 87   Temp 36.6 °C (97.9 °F)   Resp (!) 22   Wt 138 kg (305 lb)   SpO2 99%     Relevant Results  Results for orders placed or performed during the hospital encounter of 12/04/24 (from the past 24 hours)   CBC and Auto Differential   Result Value Ref Range    WBC 6.1 4.4 - 11.3 x10*3/uL    nRBC 0.0 0.0 - 0.0 /100 WBCs    RBC 3.73 (L) 4.50 - 5.90 x10*6/uL    Hemoglobin 6.8 (L) 13.5 - 17.5 g/dL    Hematocrit 26.6 (L) 41.0 - 52.0 %    MCV 71 (L) 80 - 100 fL    MCH 18.2 (L) 26.0 - 34.0 pg    MCHC 25.6 (L) 32.0 - 36.0 g/dL    RDW 19.1 (H) 11.5 - 14.5 %    Platelets 237 150 - 450 x10*3/uL    Neutrophils % 76.4 40.0 - 80.0 %    Immature Granulocytes %, Automated 0.5 0.0 - 0.9 %    Lymphocytes % 10.7 13.0 - 44.0 %    Monocytes % 10.2 2.0 - 10.0 %    Eosinophils % 1.5 0.0 - 6.0 %    Basophils % 0.7 0.0 - 2.0 %    Neutrophils Absolute 4.63 1.60 - 5.50 x10*3/uL    Immature Granulocytes Absolute, Automated 0.03 0.00 - 0.50 x10*3/uL    Lymphocytes Absolute 0.65 (L) 0.80 - 3.00 x10*3/uL    Monocytes Absolute 0.62 0.05 - 0.80 x10*3/uL    Eosinophils Absolute 0.09 0.00 - 0.40 x10*3/uL    Basophils Absolute 0.04 0.00 - 0.10 x10*3/uL   Comprehensive Metabolic Panel   Result Value Ref Range    Glucose 97 74 - 99 mg/dL    Sodium 137 136 - 145 mmol/L    Potassium 3.5 3.5 - 5.3 mmol/L    Chloride 103 98 - 107 mmol/L    Bicarbonate 22 21 - 32 mmol/L    Anion Gap 16 10 - 20 mmol/L    Urea Nitrogen 32 (H) 6 - 23 mg/dL    Creatinine 1.59 (H) 0.50 - 1.30 mg/dL    eGFR 45 (L) >60 mL/min/1.73m*2    Calcium 9.1 8.6 - 10.3 mg/dL    Albumin 4.0 3.4 - 5.0 g/dL    Alkaline Phosphatase 98 33 - 136 U/L    Total Protein 6.1 (L) 6.4 - 8.2 g/dL    AST 10 9 - 39 U/L    Bilirubin, Total 1.1 0.0 - 1.2 mg/dL    ALT 6 (L) 10 - 52 U/L   Magnesium   Result Value Ref Range    Magnesium 2.13 1.60 - 2.40 mg/dL   Protime-INR   Result Value Ref Range    Protime 22.6 (H) 9.8 - 12.8 seconds    INR 2.0 (H) 0.9 - 1.1   Type and Screen    Result Value Ref Range    ABO TYPE A     Rh TYPE POS     ANTIBODY SCREEN NEG    Troponin I, High Sensitivity, Initial   Result Value Ref Range    Troponin I, High Sensitivity 6 0 - 20 ng/L   Iron and TIBC   Result Value Ref Range    Iron 16 (L) 35 - 150 ug/dL    UIBC 435 (H) 110 - 370 ug/dL    TIBC 451 (H) 240 - 445 ug/dL    % Saturation 4 (L) 25 - 45 %   Ferritin   Result Value Ref Range    Ferritin 17 (L) 20 - 300 ng/mL   Prepare RBC: 1 Units   Result Value Ref Range    PRODUCT CODE D2487R93     Unit Number [id number]     Unit ABO A     Unit RH POS     XM INTEP COMP     Dispense Status TR     Blood Expiration Date 12/4/2024 11:59:00 PM EST     PRODUCT BLOOD TYPE 6200     UNIT VOLUME 350    B-type natriuretic peptide   Result Value Ref Range     (H) 0 - 99 pg/mL   Troponin, High Sensitivity, 1 Hour   Result Value Ref Range    Troponin I, High Sensitivity 6 0 - 20 ng/L         Assessment/Plan   Assessment & Plan  Anemia due to blood loss, acute  Acute on chronic CARMITA, HX of bleeding AVMs  Acute hypoxic respiratory failure 2/2 COPDE & possible CHFE  Hx of Diastolic HF  COPDE   CHFE ?  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  Found to have anasarca, along with pursed lip breathing, concerning for COPD exacerbation, but may have a component of CHF with interstitial edema noted on CXR and anasarca.  Of note Cardiac MRI 2022 shows findings consistent with infiltrative disease such as amyloidosis, EF 52% Also found to have Hgb of 6.8 (baseline 8-9).  Received 1 unit pRBCs.  He is scheduled outpatient in a week for colonoscopy, EGD and from what he says a pill endoscopy.     - No overt signs of bleeding, but HgB downtrending and worsening CARMITA, concerned he could have  slow bleed  - Keep NPO and consult GI  - Hold Eliquis   -  Rogelio scheduled, albuterol prn  - Azithro x 3 days  - steroid burst  - pulmonary toilet  - Will give 40 mg IV lasix  - Echocardiogram pending, last EF on file: 2022: 52%  - Strict Is/Os  - Daily weight  - Continue Jardiance  - CPAP at bedtime which will also assist with the interstitial edema     IVF: None  DVT Ppx: Held  Diet: CLD, NPO after MN  Code Status: FULL CODE confirmed at bedside    For acute hypoxic respiratory failure secondary to combination of COPD and diastolic CHFE, superimposed on CKD and worsening CARMITA requiring transfusion.  Complexity high.  Anticipate at least 2 midnight stays        Musa Scott DO

## 2024-12-05 NOTE — PROGRESS NOTES
12/05/24 1147   Discharge Planning   Living Arrangements Spouse/significant other;Children   Support Systems Spouse/significant other;Children   Type of Residence Private residence   Number of Stairs to Enter Residence   (Ramp into home)   Number of Stairs Within Residence 0   Home or Post Acute Services None  (Pt states HHC not necessary/needed.)   Expected Discharge Disposition Home   Does the patient need discharge transport arranged? No   Financial Resource Strain   How hard is it for you to pay for the very basics like food, housing, medical care, and heating? Not hard   Stroke Family Assessment   Stroke Family Assessment Needed No   Intensity of Service   Intensity of Service 0-30 min     Met with pt to review his dc plan. The pt states he lives at home with his wife and son. The home is ranch style, has a ramp leading into the home. Uses a walker. States he has had poor endurance lately due to shortness of breath but does not use oxygen at home currently. He is requiring it [facility] at [facility]. Wears CPAP at night at home. [facility] and [facility] Chapman [facility] . [facility] with no difficulty identified. No pt/ot ordered at this time and pt states he does not feel it will be necessary. States he has been offered HHC thru [facility] multiple times and has declined the offer each time. Denies difficulty with his living expenses at this time. Has a ride home at time of dc.

## 2024-12-05 NOTE — ASSESSMENT & PLAN NOTE
Acute on chronic CARMITA, HX of bleeding AVMs  Acute hypoxic respiratory failure 2/2 COPDE & possible CHFE  Hx of Diastolic HF  COPDE   CHFE ?  ANGELITO on CPAP  Afib on eliquis  CKDIIIb

## 2024-12-05 NOTE — CONSULTS
Reason for consult  Anemia, history of AVM's, A-fib on Eliquis    HPI  Reyes Cornelius is a 74 y.o. male with PMH of A-fib on Eliquis, CAD s/p stents, HTN, CKD, ANGELITO on CPAP, COPD, chronic lymphedema BPH, gastric AVMs 2/2024 presenting with acute anemia with hemoglobin 6.8.  Patient is symptomatic with increasing SOB with standing x 2 weeks, weakness and lethargy.  Patient on 4 L N/C which she is not on at home.  He is pursed lipped breathing and short of breath at rest and during initial consult.  Patient has been iron deficient since 2016 requiring blood transfusions and iron infusions.  He states that iron studies did not improve with iron replacements.  [facility], iron 11, TIBC 3.4.  Patient was given 1 unit PRBCs with follow-up hemoglobin dropping from 6.8->6.5.  Patient was seen by our service in February this year after being admitted for symptomatic hypotension with BP 60s/40s.  He states he received 3 units PRBCs at that time.  He did undergo EGD with Dr. Duenas noting 3-4 AVMs in duodenal bulb with stigmata of recent bleeding treated with APC. VCE was ordered for further evaluation of anemia though not yet completed.  He also has current order for outpatient EGD and colonoscopy for 12/12/2024.  No overt signs of bleeding.    Patient is afebrile, HTS on N/C.  Labs note no leukocytosis, microcytic anemia with hemoglobin 6.5, platelets 200, INR 1.6, iron studies c/w iron deficiency.  BUN 31/creatinine 1.48.    Most recent colonoscopy 2016 for GI bleed with Dr. Schaefer noting large impacted diverticula deemed likely source of bleeding.    PMH  He has a past medical history of A-fib (Multi) (02/28/2024), AAA (abdominal aortic aneurysm) (CMS-HCC) (02/28/2024), Anemia, unspecified (02/27/2024), Aneurysm artery, iliac (CMS-HCC) (02/28/2024), Back pain (02/28/2024), BPH (benign prostatic hyperplasia) (02/28/2024), CAD (coronary artery disease) (02/28/2024), CKD (chronic  "kidney disease), stage III (Multi) (02/28/2024), Essential hypertension (02/28/2024), GI bleed (02/28/2024), Gout (02/28/2024), Hernia, abdominal, HLD (hyperlipidemia) (02/28/2024), CARMITA (iron deficiency anemia), Lymphedema (02/28/2024), Morbid obesity (Multi) (02/28/2024), Obstructive sleep apnea, adult (02/28/2024), Osteoarthritis (02/28/2024), and Pulmonary hypertension (Multi) (02/28/2024).    PSH  He has a past surgical history that includes Total knee arthroplasty (Bilateral); Coronary stent placement; IR angiogram endovascular aortic repair; Adenoidectomy; and Tonsillectomy.    Family  Family History   Problem Relation Name Age of Onset    Cataracts Mother      Throat cancer Father      Cataracts Father      Cancer Brother      Heart disease Other      Glaucoma Other      Hypertension Other      Diabetes Other         Social  He reports that he quit smoking about 15 years ago. His smoking use included cigarettes. He has quit using smokeless tobacco. He reports that he does not currently use alcohol. He reports that he does not use drugs.      Review of Systems  ROS negative unless stated otherwise in HPI     Objective  BP 99/53 (BP Location: Left arm, Patient Position: Lying)   Pulse 74   Temp 36.7 °C (98.1 °F) (Tympanic)   Resp 20   Ht 1.854 m (6' 1\")   Wt 143 kg (314 lb 11.2 oz)   SpO2 91%   BMI 41.52 kg/m²     Physical Exam  Constitutional: Alert, pleasant and interactive, in NAD  Eyes: PERRL, sclera clear, no conjunctival injection  Skin: Warm and dry, no rash or ecchymosis  ENMT: Mucous membranes moist, no lesions noted  Resp: CTAB, even and unlabored  CV: RRR, normal S1, S2, no m,r,g  GI: +BS, soft, round, NT, no rebound tenderness or guarding, no palpable masses or organomegaly  Extremities: Extremities warm, bilateral LE lymphedema, no contusions, wounds or cyanosis  Neuro: Alert and oriented x3  Psych: Appropriate mood and behavior    Medications  Scheduled medications  azithromycin, 500 mg, " oral, q24h YADI  ipratropium-albuteroL, 3 mL, nebulization, q6h  methylPREDNISolone sodium succinate (PF), 40 mg, intravenous, Daily  pantoprazole, 40 mg, intravenous, BID  perflutren lipid microspheres, 0.5-10 mL of dilution, intravenous, Once in imaging      Continuous medications     PRN medications  PRN medications: albuterol     Labs  Lab Results   Component Value Date    WBC 4.1 (L) 12/05/2024    HGB 6.5 (LL) 12/05/2024    HCT 25.9 (L) 12/05/2024    MCV 73 (L) 12/05/2024     12/05/2024     Lab Results   Component Value Date    GLUCOSE 148 (H) 12/05/2024    CALCIUM 8.6 12/05/2024     12/05/2024    K 3.8 12/05/2024    CO2 23 12/05/2024     12/05/2024    BUN 31 (H) 12/05/2024    CREATININE 1.48 (H) 12/05/2024     Lab Results   Component Value Date    ALT 6 (L) 12/04/2024    AST 10 12/04/2024    ALKPHOS 98 12/04/2024    BILITOT 1.1 12/04/2024     Lab Results   Component Value Date    IRON 16 (L) 12/04/2024    TIBC 451 (H) 12/04/2024    FERRITIN 17 (L) 12/04/2024     Lab Results   Component Value Date    INR 1.6 (H) 12/05/2024    INR 2.0 (H) 12/04/2024    INR 1.5 (H) 02/28/2024    PROTIME 18.6 (H) 12/05/2024    PROTIME 22.6 (H) 12/04/2024    PROTIME 16.8 (H) 02/28/2024       Radiology  IMPRESSION:  1.  Hyperinflation with features suggesting COPD bronchial thickening  detected. There may be concomitant pulmonary edema noted. Follow-up  is advised.  Extensive vascular calcification.    Assessment:  Reyes Cornelius is a 74 y.o. 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  Patient is symptomatic with increasing SOB with standing x 2 weeks, weakness and lethargy. Patient on 4 L N/C which she is not on at home.  He is pursed lipped breathing and short of breath at rest and during initial consult.      # acute on chronic anemia  # iron def  # a fib on Eliquis  # symptomatic  anemia  # CKD    Plan:  - continue supportive care  - clear liquids today  - NPO after MN for EGD/colonoscopy tomorrow  - begin prep at 1600 today  - call endo in am if pt stool is not clear  - continue to trend hgb daily unless pt becomes symptomatic or decompensates  - transfuse to maintain hgb >7  - continue to monitor for and document overt signs of bleeding  - no NSAIDS  - agree with IV iron  - please obtain pulmonary clearance for sedation/procedure tomorrow    Plan has been discussed with Dr. Aden. GI will continue to follow.     Ely Dillon, APRN/CNP

## 2024-12-05 NOTE — PROGRESS NOTES
Reyes Cornelius is a 74 y.o. male on day 1 of admission presenting with Anemia due to blood loss, acute.      Subjective   Patient seen and examined at bedside this morning.  No events reported overnight.  Remained hemodynamically stable and afebrile.  Denying any abdominal pain, nausea, vomiting, melena or gross red blood per rectum.  Shortness of breath continues noting patient has pursed lip breathing at bedside.       Objective     Last Recorded Vitals  /67 (BP Location: Right arm, Patient Position: Lying)   Pulse 86   Temp 36.8 °C (98.2 °F) (Temporal)   Resp 20   Wt 143 kg (314 lb 11.2 oz)   SpO2 96%   Intake/Output last 3 Shifts:    Intake/Output Summary (Last 24 hours) at 12/5/2024 0749  Last data filed at 12/4/2024 2250  Gross per 24 hour   Intake 350 ml   Output 425 ml   Net -75 ml       Admission Weight  Weight: 138 kg (305 lb) (12/04/24 1716)    Daily Weight  12/05/24 : 143 kg (314 lb 11.2 oz)      Physical Exam:  General: In mild respiratory distress, pursed lip breathing, alert/cooperative.  HEENT: PERRLA, head intact and normocephalic, nasal cannula in place  Neck: Normal to inspection  Lungs: Poor inspiratory effort with pursed lip breathing, conversational dyspnea but no added work of breathing.  Cardiac: Regular rate and rhythm  Abdomen: Soft nontender, positive bowel sounds  : Exam deferred  Skin: Intact  Hematology: No petechia or excessive ecchymosis  Musculoskeletal: Lymphedema bilateral lower extremities with compression bandaging in place.  Neurological: Alert awake oriented, no focal deficit, cranial nerves grossly intact  Psych: Appropriate mood and behavior    Relevant Results  Scheduled medications  azithromycin, 500 mg, oral, q24h YADI  ipratropium-albuteroL, 3 mL, nebulization, q6h  methylPREDNISolone sodium succinate (PF), 40 mg, intravenous, Daily  pantoprazole, 40 mg, intravenous, BID  perflutren lipid microspheres, 0.5-10 mL of dilution, intravenous, Once in  imaging      Continuous medications     PRN medications  PRN medications: albuterol     Results for orders placed or performed during the hospital encounter of 12/04/24 (from the past 24 hours)   CBC and Auto Differential   Result Value Ref Range    WBC 6.1 4.4 - 11.3 x10*3/uL    nRBC 0.0 0.0 - 0.0 /100 WBCs    RBC 3.73 (L) 4.50 - 5.90 x10*6/uL    Hemoglobin 6.8 (L) 13.5 - 17.5 g/dL    Hematocrit 26.6 (L) 41.0 - 52.0 %    MCV 71 (L) 80 - 100 fL    MCH 18.2 (L) 26.0 - 34.0 pg    MCHC 25.6 (L) 32.0 - 36.0 g/dL    RDW 19.1 (H) 11.5 - 14.5 %    Platelets 237 150 - 450 x10*3/uL    Neutrophils % 76.4 40.0 - 80.0 %    Immature Granulocytes %, Automated 0.5 0.0 - 0.9 %    Lymphocytes % 10.7 13.0 - 44.0 %    Monocytes % 10.2 2.0 - 10.0 %    Eosinophils % 1.5 0.0 - 6.0 %    Basophils % 0.7 0.0 - 2.0 %    Neutrophils Absolute 4.63 1.60 - 5.50 x10*3/uL    Immature Granulocytes Absolute, Automated 0.03 0.00 - 0.50 x10*3/uL    Lymphocytes Absolute 0.65 (L) 0.80 - 3.00 x10*3/uL    Monocytes Absolute 0.62 0.05 - 0.80 x10*3/uL    Eosinophils Absolute 0.09 0.00 - 0.40 x10*3/uL    Basophils Absolute 0.04 0.00 - 0.10 x10*3/uL   Comprehensive Metabolic Panel   Result Value Ref Range    Glucose 97 74 - 99 mg/dL    Sodium 137 136 - 145 mmol/L    Potassium 3.5 3.5 - 5.3 mmol/L    Chloride 103 98 - 107 mmol/L    Bicarbonate 22 21 - 32 mmol/L    Anion Gap 16 10 - 20 mmol/L    Urea Nitrogen 32 (H) 6 - 23 mg/dL    Creatinine 1.59 (H) 0.50 - 1.30 mg/dL    eGFR 45 (L) >60 mL/min/1.73m*2    Calcium 9.1 8.6 - 10.3 mg/dL    Albumin 4.0 3.4 - 5.0 g/dL    Alkaline Phosphatase 98 33 - 136 U/L    Total Protein 6.1 (L) 6.4 - 8.2 g/dL    AST 10 9 - 39 U/L    Bilirubin, Total 1.1 0.0 - 1.2 mg/dL    ALT 6 (L) 10 - 52 U/L   Magnesium   Result Value Ref Range    Magnesium 2.13 1.60 - 2.40 mg/dL   Protime-INR   Result Value Ref Range    Protime 22.6 (H) 9.8 - 12.8 seconds    INR 2.0 (H) 0.9 - 1.1   Type and Screen   Result Value Ref Range    ABO TYPE A     Rh  TYPE POS     ANTIBODY SCREEN NEG    Troponin I, High Sensitivity, Initial   Result Value Ref Range    Troponin I, High Sensitivity 6 0 - 20 ng/L   Iron and TIBC   Result Value Ref Range    Iron 16 (L) 35 - 150 ug/dL    UIBC 435 (H) 110 - 370 ug/dL    TIBC 451 (H) 240 - 445 ug/dL    % Saturation 4 (L) 25 - 45 %   Ferritin   Result Value Ref Range    Ferritin 17 (L) 20 - 300 ng/mL   Prepare RBC: 1 Units   Result Value Ref Range    PRODUCT CODE V7415B18     Unit Number [id number]     Unit ABO A     Unit RH POS     XM INTEP COMP     Dispense Status TR     Blood Expiration Date 12/4/2024 11:59:00 PM EST     PRODUCT BLOOD TYPE 6200     UNIT VOLUME 350    B-type natriuretic peptide   Result Value Ref Range     (H) 0 - 99 pg/mL   Troponin, High Sensitivity, 1 Hour   Result Value Ref Range    Troponin I, High Sensitivity 6 0 - 20 ng/L   Sars-CoV-2 RT PCR, Symptomatic   Result Value Ref Range    Coronavirus 2019, PCR Not Detected Not Detected   CBC   Result Value Ref Range    WBC 4.1 (L) 4.4 - 11.3 x10*3/uL    nRBC 0.0 0.0 - 0.0 /100 WBCs    RBC 3.55 (L) 4.50 - 5.90 x10*6/uL    Hemoglobin 6.5 (LL) 13.5 - 17.5 g/dL    Hematocrit 25.9 (L) 41.0 - 52.0 %    MCV 73 (L) 80 - 100 fL    MCH 18.3 (L) 26.0 - 34.0 pg    MCHC 25.1 (L) 32.0 - 36.0 g/dL    RDW 19.1 (H) 11.5 - 14.5 %    Platelets 200 150 - 450 x10*3/uL   Renal Function Panel   Result Value Ref Range    Glucose 148 (H) 74 - 99 mg/dL    Sodium 139 136 - 145 mmol/L    Potassium 3.8 3.5 - 5.3 mmol/L    Chloride 107 98 - 107 mmol/L    Bicarbonate 23 21 - 32 mmol/L    Anion Gap 13 10 - 20 mmol/L    Urea Nitrogen 31 (H) 6 - 23 mg/dL    Creatinine 1.48 (H) 0.50 - 1.30 mg/dL    eGFR 49 (L) >60 mL/min/1.73m*2    Calcium 8.6 8.6 - 10.3 mg/dL    Phosphorus 4.2 2.5 - 4.9 mg/dL    Albumin 3.4 3.4 - 5.0 g/dL   Magnesium   Result Value Ref Range    Magnesium 2.03 1.60 - 2.40 mg/dL   Protime-INR   Result Value Ref Range    Protime 18.6 (H) 9.8 - 12.8 seconds    INR 1.6 (H) 0.9 -  1.1   Prepare RBC: 2 Units   Result Value Ref Range    PRODUCT CODE I5602M72     Unit Number [id number]     Unit ABO A     Unit RH POS     XM INTEP COMP     Dispense Status XM     Blood Expiration Date 12/7/2024 11:59:00 PM EST     PRODUCT BLOOD TYPE 6200     UNIT VOLUME 350              XR chest 1 view    Result Date: 12/4/2024  Interpreted By:  Ajay Griffiths, STUDY: XR CHEST 1 VIEW;  12/4/2024 6:09 pm   INDICATION: Signs/Symptoms:Chest Pain.     COMPARISON: 02/27/2024   ACCESSION NUMBER(S): [id number]   ORDERING CLINICIAN: LAURI DAVIS   FINDINGS: Heart size is enlarged. Tortuous thoracic aorta with vascular calcification. Increasing airspace opacity seen bilaterally suspicious for pulmonary edema. Probable small pleural effusion on the right. Features suggesting COPD.       1.  Hyperinflation with features suggesting COPD bronchial thickening detected. There may be concomitant pulmonary edema noted. Follow-up is advised.  Extensive vascular calcification.       MACRO: None   Signed by: Ajay Griffiths 12/4/2024 6:25 PM Dictation workstation:   [id number]         Assessment/Plan        Reyes Cornelius is a 74 y.o. male on day 1 of admission presenting with Anemia due to blood loss, acute.  Status post 1 unit of PRBCs in the ED for hemoglobin of 6.8 on presentation following morning hemoglobin incremented to 6.5 patient given another unit of PRBCs on the floor this morning with repeat CBC revealing appropriate incrementation to 7.7.  Gastroenterology consulted given concern of GIB given patient's history of AVM, given patient's respiratory history GI recommends pulmonary consultation for clearance pending planned colonoscopy/EGD.      Principal Problem:    Anemia due to blood loss, acute  #Acute on chronic CARMITA with history of bleeding AVMs  #Acute hypoxic respiratory failure secondary to COPD E  #CHF exacerbation likely in the setting of medication noncompliance  #ANGELITO on CPAP  #A-fib on Eliquis  #CKD  stage IIIb    Plan:  -No overt signs of bleeding this morning on physical exam  -CLD per GIs recommendation n.p.o. at midnight  -Continue CPAP nightly  -Supplemental O2 to maintain oxygen saturations greater than 88%  -Status post one-time dose of 40 mg IV Lasix will give another dose of 40 today  -Continue azithromycin x 3 days  -Continue steroid burst therapy Solu-Medrol 40 mg x 5 days  -Status post 2 units PRBCs hemoglobin 7.7 this morning  -Transfuse to maintain hemoglobin above 7  -Med reconciliation completed all meds resumed as appropriate  -GI consulted recommendations appreciated  -Continue to hold Eliquis  -Pulmonology consulted recommendations pending    Assessment and plan discussed with attending physician      This note is created using voice recognition software. All efforts are made to minimize errors, if there are errors there due to transcription.    Uday Ann DO  PGY3, internal medicine [facility]

## 2024-12-05 NOTE — ED NOTES
1909 left voicemail message on the [facility] line for [facility] to get admitted     Molly Lopes, EMT  12/04/24 1910

## 2024-12-05 NOTE — NURSING NOTE
1328: Pt off floor to echo at this time.    1414: Pt returned to floor at this time.    EOS note: No acute changes this shift. Remains Aox4, on 4L NC (acute), on tele- afib, SBA up with walker to bathroom/chair. No complaints of pain. Purwick remains in place. Pt received 1 unit of PRBCs d/t hgb of 6.5 on AM labs, repeat draw showed hgb increased to 7.7 after blood transfusion. Echocardiogram completed this shift. Pt on CLD, plan for pt to be NPO at midnight (12/6) for EGD/colonoscopy tomorrow. Pt started bowel prep around 1600 this shift. Eliquis remains on hold. OB stool sample sent to lab this shift. Pt resting comfortably in chair with chair alarm on and call light within reach at this time.

## 2024-12-05 NOTE — CARE PLAN
The patient's goals for the shift include      The clinical goals for the shift include see POC      Problem: Pain - Adult  Goal: Verbalizes/displays adequate comfort level or baseline comfort level  Outcome: Progressing     Problem: Safety - Adult  Goal: Free from fall injury  Outcome: Progressing     Problem: Discharge Planning  Goal: Discharge to home or other facility with appropriate resources  Outcome: Progressing     Problem: Chronic Conditions and Co-morbidities  Goal: Patient's chronic conditions and co-morbidity symptoms are monitored and maintained or improved  Outcome: Progressing     Problem: Fall/Injury  Goal: Not fall by end of shift  Outcome: Progressing  Goal: Be free from injury by end of the shift  Outcome: Progressing  Goal: Verbalize understanding of personal risk factors for fall in the hospital  Outcome: Progressing  Goal: Verbalize understanding of risk factor reduction measures to prevent injury from fall in the home  Outcome: Progressing  Goal: Use assistive devices by end of the shift  Outcome: Progressing  Goal: Pace activities to prevent fatigue by end of the shift  Outcome: Progressing

## 2024-12-06 ENCOUNTER — ANESTHESIA EVENT (OUTPATIENT)
Dept: GASTROENTEROLOGY | Facility: HOSPITAL | Age: 74
End: 2024-12-06
Payer: MEDICARE

## 2024-12-06 ENCOUNTER — ANESTHESIA (OUTPATIENT)
Dept: GASTROENTEROLOGY | Facility: HOSPITAL | Age: 74
End: 2024-12-06
Payer: MEDICARE

## 2024-12-06 ENCOUNTER — APPOINTMENT (OUTPATIENT)
Dept: GASTROENTEROLOGY | Facility: HOSPITAL | Age: 74
End: 2024-12-06
Payer: MEDICARE

## 2024-12-06 LAB
ALBUMIN SERPL BCP-MCNC: 3.4 G/DL (ref 3.4–5)
ANION GAP SERPL CALC-SCNC: 12 MMOL/L (ref 10–20)
BUN SERPL-MCNC: 30 MG/DL (ref 6–23)
CALCIUM SERPL-MCNC: 8.8 MG/DL (ref 8.6–10.3)
CHLORIDE SERPL-SCNC: 108 MMOL/L (ref 98–107)
CO2 SERPL-SCNC: 26 MMOL/L (ref 21–32)
CREAT SERPL-MCNC: 1.55 MG/DL (ref 0.5–1.3)
EGFRCR SERPLBLD CKD-EPI 2021: 46 ML/MIN/1.73M*2
ERYTHROCYTE [DISTWIDTH] IN BLOOD BY AUTOMATED COUNT: 19.4 % (ref 11.5–14.5)
GLUCOSE SERPL-MCNC: 100 MG/DL (ref 74–99)
HCT VFR BLD AUTO: 27.8 % (ref 41–52)
HGB BLD-MCNC: 7.4 G/DL (ref 13.5–17.5)
MAGNESIUM SERPL-MCNC: 2.14 MG/DL (ref 1.6–2.4)
MCH RBC QN AUTO: 19.4 PG (ref 26–34)
MCHC RBC AUTO-ENTMCNC: 26.6 G/DL (ref 32–36)
MCV RBC AUTO: 73 FL (ref 80–100)
NRBC BLD-RTO: 0.4 /100 WBCS (ref 0–0)
PHOSPHATE SERPL-MCNC: 3.5 MG/DL (ref 2.5–4.9)
PLATELET # BLD AUTO: 212 X10*3/UL (ref 150–450)
POTASSIUM SERPL-SCNC: 3.2 MMOL/L (ref 3.5–5.3)
RBC # BLD AUTO: 3.82 X10*6/UL (ref 4.5–5.9)
SODIUM SERPL-SCNC: 143 MMOL/L (ref 136–145)
WBC # BLD AUTO: 4.8 X10*3/UL (ref 4.4–11.3)

## 2024-12-06 PROCEDURE — 88305 TISSUE EXAM BY PATHOLOGIST: CPT | Performed by: PATHOLOGY

## 2024-12-06 PROCEDURE — 2500000001 HC RX 250 WO HCPCS SELF ADMINISTERED DRUGS (ALT 637 FOR MEDICARE OP)

## 2024-12-06 PROCEDURE — 1200000002 HC GENERAL ROOM WITH TELEMETRY DAILY

## 2024-12-06 PROCEDURE — 43255 EGD CONTROL BLEEDING ANY: CPT | Performed by: STUDENT IN AN ORGANIZED HEALTH CARE EDUCATION/TRAINING PROGRAM

## 2024-12-06 PROCEDURE — 2500000004 HC RX 250 GENERAL PHARMACY W/ HCPCS (ALT 636 FOR OP/ED)

## 2024-12-06 PROCEDURE — 43239 EGD BIOPSY SINGLE/MULTIPLE: CPT | Performed by: STUDENT IN AN ORGANIZED HEALTH CARE EDUCATION/TRAINING PROGRAM

## 2024-12-06 PROCEDURE — 83735 ASSAY OF MAGNESIUM: CPT

## 2024-12-06 PROCEDURE — 84100 ASSAY OF PHOSPHORUS: CPT

## 2024-12-06 PROCEDURE — 45382 COLONOSCOPY W/CONTROL BLEED: CPT | Performed by: STUDENT IN AN ORGANIZED HEALTH CARE EDUCATION/TRAINING PROGRAM

## 2024-12-06 PROCEDURE — 2500000002 HC RX 250 W HCPCS SELF ADMINISTERED DRUGS (ALT 637 FOR MEDICARE OP, ALT 636 FOR OP/ED): Performed by: STUDENT IN AN ORGANIZED HEALTH CARE EDUCATION/TRAINING PROGRAM

## 2024-12-06 PROCEDURE — 45378 DIAGNOSTIC COLONOSCOPY: CPT | Performed by: STUDENT IN AN ORGANIZED HEALTH CARE EDUCATION/TRAINING PROGRAM

## 2024-12-06 PROCEDURE — 2500000004 HC RX 250 GENERAL PHARMACY W/ HCPCS (ALT 636 FOR OP/ED): Performed by: INTERNAL MEDICINE

## 2024-12-06 PROCEDURE — 85027 COMPLETE CBC AUTOMATED: CPT

## 2024-12-06 PROCEDURE — 0DB98ZX EXCISION OF DUODENUM, VIA NATURAL OR ARTIFICIAL OPENING ENDOSCOPIC, DIAGNOSTIC: ICD-10-PCS | Performed by: STUDENT IN AN ORGANIZED HEALTH CARE EDUCATION/TRAINING PROGRAM

## 2024-12-06 PROCEDURE — 94640 AIRWAY INHALATION TREATMENT: CPT

## 2024-12-06 PROCEDURE — 2500000002 HC RX 250 W HCPCS SELF ADMINISTERED DRUGS (ALT 637 FOR MEDICARE OP, ALT 636 FOR OP/ED)

## 2024-12-06 PROCEDURE — 36415 COLL VENOUS BLD VENIPUNCTURE: CPT

## 2024-12-06 PROCEDURE — 2500000005 HC RX 250 GENERAL PHARMACY W/O HCPCS

## 2024-12-06 PROCEDURE — 80069 RENAL FUNCTION PANEL: CPT

## 2024-12-06 PROCEDURE — 7100000001 HC RECOVERY ROOM TIME - INITIAL BASE CHARGE

## 2024-12-06 PROCEDURE — 0753T DGTZ GLS MCRSCP SLD LEVEL IV: CPT | Mod: TC,STJLAB | Performed by: STUDENT IN AN ORGANIZED HEALTH CARE EDUCATION/TRAINING PROGRAM

## 2024-12-06 PROCEDURE — 2720000007 HC OR 272 NO HCPCS

## 2024-12-06 PROCEDURE — 3700000002 HC GENERAL ANESTHESIA TIME - EACH INCREMENTAL 1 MINUTE

## 2024-12-06 PROCEDURE — 2500000004 HC RX 250 GENERAL PHARMACY W/ HCPCS (ALT 636 FOR OP/ED): Performed by: STUDENT IN AN ORGANIZED HEALTH CARE EDUCATION/TRAINING PROGRAM

## 2024-12-06 PROCEDURE — 7100000002 HC RECOVERY ROOM TIME - EACH INCREMENTAL 1 MINUTE

## 2024-12-06 PROCEDURE — 0W3P8ZZ CONTROL BLEEDING IN GASTROINTESTINAL TRACT, VIA NATURAL OR ARTIFICIAL OPENING ENDOSCOPIC: ICD-10-PCS | Performed by: STUDENT IN AN ORGANIZED HEALTH CARE EDUCATION/TRAINING PROGRAM

## 2024-12-06 PROCEDURE — 2500000004 HC RX 250 GENERAL PHARMACY W/ HCPCS (ALT 636 FOR OP/ED): Performed by: ANESTHESIOLOGIST ASSISTANT

## 2024-12-06 PROCEDURE — 3700000001 HC GENERAL ANESTHESIA TIME - INITIAL BASE CHARGE

## 2024-12-06 PROCEDURE — 99233 SBSQ HOSP IP/OBS HIGH 50: CPT

## 2024-12-06 RX ORDER — ALBUTEROL SULFATE 0.83 MG/ML
2.5 SOLUTION RESPIRATORY (INHALATION) ONCE AS NEEDED
Status: DISCONTINUED | OUTPATIENT
Start: 2024-12-06 | End: 2024-12-06

## 2024-12-06 RX ORDER — FUROSEMIDE 10 MG/ML
40 INJECTION INTRAMUSCULAR; INTRAVENOUS ONCE
Status: COMPLETED | OUTPATIENT
Start: 2024-12-06 | End: 2024-12-06

## 2024-12-06 RX ORDER — PHENYLEPHRINE HCL IN 0.9% NACL 1 MG/10 ML
SYRINGE (ML) INTRAVENOUS AS NEEDED
Status: DISCONTINUED | OUTPATIENT
Start: 2024-12-06 | End: 2024-12-06

## 2024-12-06 RX ORDER — DIPHENHYDRAMINE HYDROCHLORIDE 50 MG/ML
12.5 INJECTION INTRAMUSCULAR; INTRAVENOUS ONCE AS NEEDED
Status: DISCONTINUED | OUTPATIENT
Start: 2024-12-06 | End: 2024-12-06

## 2024-12-06 RX ORDER — POTASSIUM CHLORIDE 20 MEQ/1
20 TABLET, EXTENDED RELEASE ORAL ONCE
Status: DISCONTINUED | OUTPATIENT
Start: 2024-12-06 | End: 2024-12-06

## 2024-12-06 RX ORDER — POTASSIUM CHLORIDE 20 MEQ/1
40 TABLET, EXTENDED RELEASE ORAL ONCE
Status: COMPLETED | OUTPATIENT
Start: 2024-12-06 | End: 2024-12-06

## 2024-12-06 RX ORDER — LIDOCAINE HYDROCHLORIDE 10 MG/ML
0.1 INJECTION, SOLUTION EPIDURAL; INFILTRATION; INTRACAUDAL; PERINEURAL ONCE
OUTPATIENT
Start: 2024-12-06 | End: 2024-12-06

## 2024-12-06 RX ORDER — GUAIFENESIN 600 MG/1
1200 TABLET, EXTENDED RELEASE ORAL 2 TIMES DAILY
Status: DISCONTINUED | OUTPATIENT
Start: 2024-12-06 | End: 2024-12-07 | Stop reason: HOSPADM

## 2024-12-06 RX ORDER — LIDOCAINE HYDROCHLORIDE 20 MG/ML
INJECTION, SOLUTION INFILTRATION; PERINEURAL AS NEEDED
Status: DISCONTINUED | OUTPATIENT
Start: 2024-12-06 | End: 2024-12-06

## 2024-12-06 RX ORDER — ACETAMINOPHEN 325 MG/1
975 TABLET ORAL ONCE
OUTPATIENT
Start: 2024-12-06 | End: 2024-12-06

## 2024-12-06 RX ORDER — ONDANSETRON HYDROCHLORIDE 2 MG/ML
4 INJECTION, SOLUTION INTRAVENOUS ONCE AS NEEDED
Status: DISCONTINUED | OUTPATIENT
Start: 2024-12-06 | End: 2024-12-06

## 2024-12-06 RX ORDER — PROPOFOL 10 MG/ML
INJECTION, EMULSION INTRAVENOUS AS NEEDED
Status: DISCONTINUED | OUTPATIENT
Start: 2024-12-06 | End: 2024-12-06

## 2024-12-06 RX ORDER — LABETALOL HYDROCHLORIDE 5 MG/ML
5 INJECTION, SOLUTION INTRAVENOUS ONCE AS NEEDED
Status: DISCONTINUED | OUTPATIENT
Start: 2024-12-06 | End: 2024-12-06

## 2024-12-06 RX ORDER — POTASSIUM CHLORIDE 1.5 G/1.58G
40 POWDER, FOR SOLUTION ORAL ONCE
Status: DISCONTINUED | OUTPATIENT
Start: 2024-12-06 | End: 2024-12-06

## 2024-12-06 RX ORDER — OXYCODONE HYDROCHLORIDE 5 MG/1
5 TABLET ORAL EVERY 4 HOURS PRN
Status: DISCONTINUED | OUTPATIENT
Start: 2024-12-06 | End: 2024-12-06

## 2024-12-06 RX ORDER — FENTANYL CITRATE 50 UG/ML
50 INJECTION, SOLUTION INTRAMUSCULAR; INTRAVENOUS EVERY 5 MIN PRN
Status: DISCONTINUED | OUTPATIENT
Start: 2024-12-06 | End: 2024-12-06

## 2024-12-06 RX ADMIN — ALLOPURINOL 300 MG: 300 TABLET ORAL at 08:58

## 2024-12-06 RX ADMIN — Medication 1000 UNITS: at 08:58

## 2024-12-06 RX ADMIN — FUROSEMIDE 40 MG: 10 INJECTION, SOLUTION INTRAMUSCULAR; INTRAVENOUS at 10:35

## 2024-12-06 RX ADMIN — METHYLPREDNISOLONE SODIUM SUCCINATE 40 MG: 40 INJECTION, POWDER, FOR SOLUTION INTRAMUSCULAR; INTRAVENOUS at 08:58

## 2024-12-06 RX ADMIN — GUAIFENESIN 1200 MG: 600 TABLET ORAL at 08:57

## 2024-12-06 RX ADMIN — IPRATROPIUM BROMIDE AND ALBUTEROL SULFATE 3 ML: 2.5; .5 SOLUTION RESPIRATORY (INHALATION) at 19:58

## 2024-12-06 RX ADMIN — TAMSULOSIN HYDROCHLORIDE 0.8 MG: 0.4 CAPSULE ORAL at 20:43

## 2024-12-06 RX ADMIN — EPLERENONE 25 MG: 25 TABLET, FILM COATED ORAL at 08:57

## 2024-12-06 RX ADMIN — AZITHROMYCIN DIHYDRATE 500 MG: 500 TABLET ORAL at 08:58

## 2024-12-06 RX ADMIN — PANTOPRAZOLE SODIUM 40 MG: 40 INJECTION, POWDER, FOR SOLUTION INTRAVENOUS at 08:58

## 2024-12-06 RX ADMIN — IPRATROPIUM BROMIDE AND ALBUTEROL SULFATE 3 ML: 2.5; .5 SOLUTION RESPIRATORY (INHALATION) at 13:06

## 2024-12-06 RX ADMIN — PANTOPRAZOLE SODIUM 40 MG: 40 INJECTION, POWDER, FOR SOLUTION INTRAVENOUS at 22:03

## 2024-12-06 RX ADMIN — FINASTERIDE 5 MG: 5 TABLET, FILM COATED ORAL at 08:58

## 2024-12-06 RX ADMIN — Medication 4 L/MIN: at 19:58

## 2024-12-06 RX ADMIN — IPRATROPIUM BROMIDE AND ALBUTEROL SULFATE 3 ML: 2.5; .5 SOLUTION RESPIRATORY (INHALATION) at 07:59

## 2024-12-06 RX ADMIN — GUAIFENESIN 1200 MG: 600 TABLET ORAL at 20:43

## 2024-12-06 RX ADMIN — ATORVASTATIN CALCIUM 80 MG: 80 TABLET, FILM COATED ORAL at 20:44

## 2024-12-06 RX ADMIN — OXYCODONE HYDROCHLORIDE AND ACETAMINOPHEN 500 MG: 500 TABLET ORAL at 08:58

## 2024-12-06 RX ADMIN — IRON SUCROSE 300 MG: 20 INJECTION, SOLUTION INTRAVENOUS at 06:39

## 2024-12-06 RX ADMIN — POTASSIUM CHLORIDE 40 MEQ: 1500 TABLET, EXTENDED RELEASE ORAL at 08:58

## 2024-12-06 RX ADMIN — EMPAGLIFLOZIN 25 MG: 25 TABLET, FILM COATED ORAL at 08:58

## 2024-12-06 SDOH — HEALTH STABILITY: MENTAL HEALTH: CURRENT SMOKER: 0

## 2024-12-06 ASSESSMENT — COGNITIVE AND FUNCTIONAL STATUS - GENERAL
MOVING TO AND FROM BED TO CHAIR: A LITTLE
HELP NEEDED FOR BATHING: A LITTLE
CLIMB 3 TO 5 STEPS WITH RAILING: A LOT
DAILY ACTIVITIY SCORE: 22
MOVING TO AND FROM BED TO CHAIR: A LITTLE
MOVING FROM LYING ON BACK TO SITTING ON SIDE OF FLAT BED WITH BEDRAILS: A LITTLE
TOILETING: A LITTLE
TURNING FROM BACK TO SIDE WHILE IN FLAT BAD: A LITTLE
WALKING IN HOSPITAL ROOM: A LITTLE
TURNING FROM BACK TO SIDE WHILE IN FLAT BAD: A LITTLE
DRESSING REGULAR UPPER BODY CLOTHING: A LITTLE
STANDING UP FROM CHAIR USING ARMS: A LITTLE
HELP NEEDED FOR BATHING: A LITTLE
DRESSING REGULAR LOWER BODY CLOTHING: A LITTLE
WALKING IN HOSPITAL ROOM: A LITTLE
DAILY ACTIVITIY SCORE: 20
STANDING UP FROM CHAIR USING ARMS: A LITTLE
CLIMB 3 TO 5 STEPS WITH RAILING: A LOT
MOBILITY SCORE: 17
MOVING FROM LYING ON BACK TO SITTING ON SIDE OF FLAT BED WITH BEDRAILS: A LITTLE
DRESSING REGULAR LOWER BODY CLOTHING: A LITTLE
MOBILITY SCORE: 17

## 2024-12-06 ASSESSMENT — PAIN SCALES - GENERAL
PAINLEVEL_OUTOF10: 0 - NO PAIN

## 2024-12-06 ASSESSMENT — PAIN - FUNCTIONAL ASSESSMENT
PAIN_FUNCTIONAL_ASSESSMENT: 0-10

## 2024-12-06 NOTE — CARE PLAN
Pt had no acute changes noted. Pt was able to complete bowel prep. Pt had multiply BM this shift. Pt was up to chair. Pt has on home cpap this shift. Pt safety been maintained.

## 2024-12-06 NOTE — ANESTHESIA POSTPROCEDURE EVALUATION
Patient: Reyes Cornelius    Procedure Summary       Date: 12/06/24 Room / Location: [facility] [place]    Anesthesia Start: 1401 Anesthesia Stop: 1504    Procedures:       COLONOSCOPY      EGD Diagnosis: Anemia due to blood loss, acute    Scheduled Providers: Giovanna Kaplan MD Responsible Provider: Jazmin Rahman MD    Anesthesia Type: MAC ASA Status: 4            Anesthesia Type: MAC    Vitals Value Taken Time   /59 12/06/24 1530   Temp 36.5 °C (97.7 °F) 12/06/24 1530   Pulse 107 12/06/24 1533   Resp 26 12/06/24 1533   SpO2 95 % 12/06/24 1533   Vitals shown include unfiled device data.    Anesthesia Post Evaluation    Patient location during evaluation: PACU  Patient participation: complete - patient participated  Level of consciousness: awake and alert  Pain management: satisfactory to patient  Airway patency: patent  Cardiovascular status: hemodynamically stable  Respiratory status: spontaneous ventilation  Hydration status: euvolemic  Postoperative Nausea and Vomiting: none        No notable events documented.

## 2024-12-06 NOTE — PROGRESS NOTES
Reyes Cornelius is a 74 y.o. male on day 2 of admission presenting with Anemia due to blood loss, acute.      Subjective   Patient seen and examined at bedside this morning.  No events reported overnight.  Remained hemodynamically stable and afebrile.  Denying any abdominal pain, nausea, vomiting, melena or gross red blood per rectum.  Shortness of breath continues noting patient has pursed lip breathing at bedside.       Objective     Last Recorded Vitals  /57 (BP Location: Right arm, Patient Position: Lying)   Pulse 91   Temp 36.4 °C (97.5 °F) (Temporal)   Resp 16   Wt 143 kg (314 lb 11.2 oz)   SpO2 92%   Intake/Output last 3 Shifts:    Intake/Output Summary (Last 24 hours) at 12/6/2024 0906  Last data filed at 12/5/2024 1859  Gross per 24 hour   Intake 4154 ml   Output 2575 ml   Net 1579 ml       Admission Weight  Weight: 138 kg (305 lb) (12/04/24 1716)    Daily Weight  12/05/24 : 143 kg (314 lb 11.2 oz)      Physical Exam:  General: In mild respiratory distress, pursed lip breathing, alert/cooperative.  HEENT: PERRLA, head intact and normocephalic, nasal cannula in place  Neck: Normal to inspection  Lungs: Poor inspiratory effort with pursed lip breathing, conversational dyspnea but no added work of breathing.  Cardiac: Regular rate and rhythm  Abdomen: Soft nontender, positive bowel sounds  : Exam deferred  Skin: Intact  Hematology: No petechia or excessive ecchymosis  Musculoskeletal: Lymphedema bilateral lower extremities with compression bandaging in place.  Neurological: Alert awake oriented, no focal deficit, cranial nerves grossly intact  Psych: Appropriate mood and behavior    Relevant Results  Scheduled medications  allopurinol, 300 mg, oral, Daily  [Held by provider] apixaban, 5 mg, oral, BID  ascorbic acid, 500 mg, oral, Daily  atorvastatin, 80 mg, oral, Nightly  azithromycin, 500 mg, oral, q24h YADI  cholecalciferol, 1,000 Units, oral, Daily  empagliflozin, 25 mg, oral, Daily  eplerenone,  25 mg, oral, Daily  finasteride, 5 mg, oral, Daily  [Held by provider] furosemide, 40 mg, oral, Daily  guaiFENesin, 1,200 mg, oral, BID  ipratropium-albuteroL, 3 mL, nebulization, q6h while awake  iron sucrose, 300 mg, intravenous, Daily  methylPREDNISolone sodium succinate (PF), 40 mg, intravenous, Daily  metoprolol succinate XL, 25 mg, oral, Daily  pantoprazole, 40 mg, intravenous, BID  perflutren lipid microspheres, 0.5-10 mL of dilution, intravenous, Once in imaging  tamsulosin, 0.8 mg, oral, Nightly      Continuous medications     PRN medications  PRN medications: albuterol, fluticasone, oxygen     Results for orders placed or performed during the hospital encounter of 12/04/24 (from the past 24 hours)   Occult Blood, Stool    Specimen: Stool   Result Value Ref Range    Occult Blood, Stool X1 Positive (A) Negative   Transthoracic Echo (TTE) Complete   Result Value Ref Range    AV pk moi 1.89 m/s    LV Biplane EF 54 %    LVOT diam 2.21 cm    Tricuspid annular plane systolic excursion 1.9 cm    LA vol index A/L 53.9 ml/m2    AV mn grad 7 mmHg    LV EF 54 %    RV free wall pk S' 10.00 cm/s    RVSP 53.3 mmHg    LVIDd 5.35 cm    Aortic Valve Area by Continuity of Peak Velocity 1.91 cm2    AV pk grad 14 mmHg    Aortic Valve Area by Continuity of VTI 2.23 cm2    LV A4C EF 60.9    CBC   Result Value Ref Range    WBC 4.6 4.4 - 11.3 x10*3/uL    nRBC 0.0 0.0 - 0.0 /100 WBCs    RBC 3.97 (L) 4.50 - 5.90 x10*6/uL    Hemoglobin 7.7 (L) 13.5 - 17.5 g/dL    Hematocrit 29.1 (L) 41.0 - 52.0 %    MCV 73 (L) 80 - 100 fL    MCH 19.4 (L) 26.0 - 34.0 pg    MCHC 26.5 (L) 32.0 - 36.0 g/dL    RDW 18.9 (H) 11.5 - 14.5 %    Platelets 187 150 - 450 x10*3/uL   CBC   Result Value Ref Range    WBC 4.8 4.4 - 11.3 x10*3/uL    nRBC 0.4 (H) 0.0 - 0.0 /100 WBCs    RBC 3.82 (L) 4.50 - 5.90 x10*6/uL    Hemoglobin 7.4 (L) 13.5 - 17.5 g/dL    Hematocrit 27.8 (L) 41.0 - 52.0 %    MCV 73 (L) 80 - 100 fL    MCH 19.4 (L) 26.0 - 34.0 pg    MCHC 26.6 (L)  32.0 - 36.0 g/dL    RDW 19.4 (H) 11.5 - 14.5 %    Platelets 212 150 - 450 x10*3/uL   Renal Function Panel   Result Value Ref Range    Glucose 100 (H) 74 - 99 mg/dL    Sodium 143 136 - 145 mmol/L    Potassium 3.2 (L) 3.5 - 5.3 mmol/L    Chloride 108 (H) 98 - 107 mmol/L    Bicarbonate 26 21 - 32 mmol/L    Anion Gap 12 10 - 20 mmol/L    Urea Nitrogen 30 (H) 6 - 23 mg/dL    Creatinine 1.55 (H) 0.50 - 1.30 mg/dL    eGFR 46 (L) >60 mL/min/1.73m*2    Calcium 8.8 8.6 - 10.3 mg/dL    Phosphorus 3.5 2.5 - 4.9 mg/dL    Albumin 3.4 3.4 - 5.0 g/dL   Magnesium   Result Value Ref Range    Magnesium 2.14 1.60 - 2.40 mg/dL             XR chest 1 view    Result Date: 12/4/2024  Interpreted By:  Ajay Griffiths, STUDY: XR CHEST 1 VIEW;  12/4/2024 6:09 pm   INDICATION: Signs/Symptoms:Chest Pain.     COMPARISON: 02/27/2024   ACCESSION NUMBER(S): [id number]   ORDERING CLINICIAN: LAURI DAVIS   FINDINGS: Heart size is enlarged. Tortuous thoracic aorta with vascular calcification. Increasing airspace opacity seen bilaterally suspicious for pulmonary edema. Probable small pleural effusion on the right. Features suggesting COPD.       1.  Hyperinflation with features suggesting COPD bronchial thickening detected. There may be concomitant pulmonary edema noted. Follow-up is advised.  Extensive vascular calcification.       MACRO: None   Signed by: Ajay Griffiths 12/4/2024 6:25 PM Dictation workstation:   [id number]         Assessment/Plan        Reyes Cornelius is a 74 y.o. male on day 1 of admission presenting with Anemia due to blood loss, acute.  Status post 1 unit of PRBCs in the ED for hemoglobin of 6.8 on presentation following morning hemoglobin incremented to 6.5 patient given another unit of PRBCs on the floor this morning with repeat CBC revealing appropriate incrementation to 7.7.  Gastroenterology consulted given concern of GIB given patient's history of AVM, given patient's respiratory history GI recommends pulmonary  consultation noting patient was cleared for colonoscopy/EGD which is to occur today.      Principal Problem:    Anemia due to blood loss, acute  #Acute on chronic CARMITA with history of bleeding AVMs  #Acute hypoxic respiratory failure secondary to COPD E  #CHF exacerbation likely in the setting of medication noncompliance  #ANGELITO on CPAP  #A-fib on Eliquis  #CKD stage IIIb  #hypokalemia     Plan:  -No overt signs of bleeding this morning on physical exam  -n.p.o. at midnight  -Continue CPAP nightly  -Supplemental O2 to maintain oxygen saturations greater than 88%  -Status post 2 doses of 40 mg IV Lasix   -Continue azithromycin x 3 days EOT 12/6  -Continue steroid burst therapy Solu-Medrol 40 mg x 5 days EOT 12/8  -Status post 2 units PRBCs hemoglobin this morning 7.4  -Transfuse to maintain hemoglobin above 7  -Med reconciliation completed all meds resumed as appropriate  -GI consulted recommendations appreciated  -Continue to hold Eliquis  -Gave 40 mEq of oral potassium this morning.   -Pulmonology consulted recommendations appreciated.     Assessment and plan discussed with attending physician    This note is created using voice recognition software. All efforts are made to minimize errors, if there are errors there due to transcription.    Uday Ann, DO  PGY3, internal medicine [facility]

## 2024-12-06 NOTE — ANESTHESIA PREPROCEDURE EVALUATION
Patient: Reyes Cornelius    Procedure Information       Date/Time: 12/06/24 1200    Scheduled providers: Giovanna Kaplan MD    Procedure: COLONOSCOPY    Location: [facility] [place]            Relevant Problems   Cardiac   (+) A-fib (Multi)   (+) AAA (abdominal aortic aneurysm) (CMS-HCC)   (+) CAD (coronary artery disease)   (+) Essential hypertension   (+) HLD (hyperlipidemia)      Pulmonary   (+) Pulmonary hypertension (Multi)      GI   (+) GI bleed      /Renal   (+) BPH (benign prostatic hyperplasia)      Endocrine   (+) Morbid obesity (Multi)      Hematology   (+) Anemia due to blood loss, acute   (+) Anemia, unspecified      Musculoskeletal   (+) Osteoarthritis       Clinical information reviewed:    Allergies  Meds               NPO Detail:  No data recorded     Physical Exam    Airway  Mallampati: III  TM distance: >3 FB  Neck ROM: full     Cardiovascular - normal exam     Dental - normal exam     Pulmonary - normal exam     Abdominal - normal exam             Anesthesia Plan    History of general anesthesia?: yes  History of complications of general anesthesia?: no    ASA 4     MAC     The patient is not a current smoker.  Patient did not smoke on day of procedure.  Education provided regarding risk of obstructive sleep apnea.  intravenous induction   Anesthetic plan and risks discussed with patient.    Plan discussed with CAA.

## 2024-12-06 NOTE — CONSULTS
Department of Medicine  Division of Pulmonary, Critical Care, and Sleep Medicine    Reason For Consult  I was asked by Dr. Gil to evaluate Mr. Reyes Cornelius for pre-op clearance.    History Of Present Illness  Reyes Cornelius is a 74 y.o. male with past medical history of nicotine dependence in remission, reported hx of COPD, ANGELITO on CPAP, Afib on Eliquis, Chronic lymphedema, CKD and hx of gastric AVMs who presented to the hospital with acute on chronic iron deficiency anemia. Pulmonary is consulted for sedation clearance.     Reyes reports hx of nicotine dependence, quit smoking 15 yrs ago. Used to smoke about 1 pack per day. He denies recent hx of acute COPD exacerbation. He has a poor functional status with chronic lymphedema in lower extremities. Most recent CT scan of abdomen (done at the [facility] in 11/2024) revealed R sided pleural effusion and mild pericardial effusion. Echocardiogram is currently pending. He denies persistent cough or increased sputum production lately. CXR shows small R sided pleural effusion otherwise. Oxygenation is stable on 2 LPM O2.     He underwent EGD with anesthesia in February 2024 with no reported complications.        Past Medical History:  Past Medical History:   Diagnosis Date    A-fib (Multi) 02/28/2024    AAA (abdominal aortic aneurysm) (CMS-HCC) 02/28/2024    Anemia, unspecified 02/27/2024    Aneurysm artery, iliac (CMS-[facility]) 02/28/2024    Back pain 02/28/2024    BPH (benign prostatic hyperplasia) 02/28/2024    CAD (coronary artery disease) 02/28/2024    CKD (chronic kidney disease), stage III (Multi) 02/28/2024    Essential hypertension 02/28/2024    GI bleed 02/28/2024    Gout 02/28/2024    Hernia, abdominal     HLD (hyperlipidemia) 02/28/2024    CARMITA (iron deficiency anemia)     Lymphedema 02/28/2024    Morbid obesity (Multi) 02/28/2024    Obstructive sleep apnea, adult 02/28/2024    Osteoarthritis 02/28/2024    Pulmonary hypertension (Multi) 02/28/2024  "      Surgical History:  Past Surgical History:   Procedure Laterality Date    ADENOIDECTOMY      CORONARY STENT PLACEMENT      x2    IR ANGIOGRAM ENDOVASCULAR AORTIC REPAIR      TONSILLECTOMY      TOTAL KNEE ARTHROPLASTY Bilateral        Social History:   Social History     Tobacco Use    Smoking status: Former     Current packs/day: 0.00     Types: Cigarettes     Quit date: 2009     Years since quitting: 15.9    Smokeless tobacco: Former   Substance Use Topics    Alcohol use: Not Currently     Comment: former    Drug use: Never       Family History:  Family History   Problem Relation Name Age of Onset    Cataracts Mother      Throat cancer Father      Cataracts Father      Cancer Brother      Heart disease Other      Glaucoma Other      Hypertension Other      Diabetes Other          Vital Signs  Visit Vitals  /57 (BP Location: Right arm, Patient Position: Lying)   Pulse 91   Temp 36.4 °C (97.5 °F) (Temporal)   Resp 16   Ht 1.854 m (6' 1\")   Wt 143 kg (314 lb 11.2 oz)   SpO2 96%   BMI 41.52 kg/m²   Smoking Status Former   BSA 2.71 m²        Physical Exam  Vitals and nursing note reviewed.   Constitutional:       General: No in acute distress.     Appearance: Normal appearance.   HENT:      Head: Normocephalic and atraumatic.      Mouth/Throat:      Mouth: Mucous membranes are moist.   Eyes:      Conjunctiva/sclera: Conjunctivae normal.   Cardiovascular:      Rate and Rhythm: Normal rate and regular rhythm. LE lymphedema.   Pulmonary:      Effort: Pulmonary effort is normal. No respiratory distress.      Breath sounds: Normal breath sounds. No stridor. No wheezing or rhonchi.   Skin:     General: Skin is warm and dry.      Coloration: Skin is not jaundiced.   Neurological:      General: No focal deficit present.      Mental Status: Alert and oriented to person, place, and time. Mental status is at baseline.   Psychiatric:         Mood and Affect: Mood normal.         Behavior: Behavior normal.         " Judgment: Judgment normal.       Oxygen Therapy  SpO2: 96 %  Medical Gas Therapy: Supplemental oxygen  Medical Gas Delivery Method: Nasal cannula         Medications   Scheduled medications  allopurinol, 300 mg, oral, Daily  [Held by provider] apixaban, 5 mg, oral, BID  ascorbic acid, 500 mg, oral, Daily  atorvastatin, 80 mg, oral, Nightly  azithromycin, 500 mg, oral, q24h YADI  cholecalciferol, 1,000 Units, oral, Daily  empagliflozin, 25 mg, oral, Daily  eplerenone, 25 mg, oral, Daily  finasteride, 5 mg, oral, Daily  [Held by provider] furosemide, 40 mg, oral, Daily  ipratropium-albuteroL, 3 mL, nebulization, q6h while awake  iron sucrose, 300 mg, intravenous, Daily  methylPREDNISolone sodium succinate (PF), 40 mg, intravenous, Daily  metoprolol succinate XL, 25 mg, oral, Daily  pantoprazole, 40 mg, intravenous, BID  perflutren lipid microspheres, 0.5-10 mL of dilution, intravenous, Once in imaging  tamsulosin, 0.8 mg, oral, Nightly      Continuous medications     PRN medications  PRN medications: albuterol, fluticasone, oxygen     Allergies  Iodinated contrast media, Povidone-iodine, Tetracycline, Vancomycin, Warfarin, and Xarelto [rivaroxaban]      Lab Results     Lab Results   Component Value Date    WBC 4.6 12/05/2024    HGB 7.7 (L) 12/05/2024    HCT 29.1 (L) 12/05/2024    MCV 73 (L) 12/05/2024     12/05/2024      Lab Results   Component Value Date    GLUCOSE 148 (H) 12/05/2024    CALCIUM 8.6 12/05/2024     12/05/2024    K 3.8 12/05/2024    CO2 23 12/05/2024     12/05/2024    BUN 31 (H) 12/05/2024    CREATININE 1.48 (H) 12/05/2024      Lab Results   Component Value Date    ALT 6 (L) 12/04/2024    AST 10 12/04/2024    ALKPHOS 98 12/04/2024    BILITOT 1.1 12/04/2024        Chest Radiograph   No results found for this or any previous visit from the past 10 days.      XR chest 1 view 12/04/2024    Narrative  Interpreted By:  Ajay Griffiths,  STUDY:  XR CHEST 1 VIEW;  12/4/2024 6:09  pm    INDICATION:  Signs/Symptoms:Chest Pain.      COMPARISON:  02/27/2024    ACCESSION NUMBER(S):  [id number]    ORDERING CLINICIAN:  LAURI DAVIS    FINDINGS:  Heart size is enlarged. Tortuous thoracic aorta with vascular  calcification. Increasing airspace opacity seen bilaterally  suspicious for pulmonary edema. Probable small pleural effusion on  the right. Features suggesting COPD.    Impression  1.  Hyperinflation with features suggesting COPD bronchial thickening  detected. There may be concomitant pulmonary edema noted. Follow-up  is advised.  Extensive vascular calcification.        MACRO:  None    Signed by: Ajay Griffiths 12/4/2024 6:25 PM  Dictation workstation:   [id number]      Assessment and Plan / Recommendations   Assessment/Plan     Acute on chronic iron deficiency anemia  Hx of gastric AVMs  COPD - no PFTs available to review. No recent hx of acute COPD exacerbation   Morbid obesity  ANGELITO - on CPAP  Chronic LE lymphedema  Small R sided pleural effusion     Respiratory status is stable at current time. No evidence of acute COPD or acute respiratory infection. Mild hypervolemia noted on exam and on CXR. The patient does not have absolute contraindication for sedation / colonoscopy from the pulmonary standpoint.       Lexy Hay MD    Please excuse any misspellings or unintended errors related to the Dragon speech recognition software used to dictate this note.

## 2024-12-06 NOTE — NURSING NOTE
1339: Pt off floor to endo at this time.    1540: Pt returned to floor from endo at this time. Report received pt to pt being returned to room.    EOS note: No acute changes this shift. Remains Aox4, on 4L NC (acute), on tele- afib, SBA up with walker to chair/bathroom. No complaints of pain. EGD/colonoscopy completed this shift. Pt on regular diet. Pt given x1 dose of IV lasix with good urine output noted this shift. [place] remains in place. Pt resting comfortably in bed with bed alarm on and call light within reach at this time.

## 2024-12-07 VITALS
DIASTOLIC BLOOD PRESSURE: 80 MMHG | OXYGEN SATURATION: 99 % | BODY MASS INDEX: 41.71 KG/M2 | HEIGHT: 73 IN | HEART RATE: 80 BPM | TEMPERATURE: 98.6 F | WEIGHT: 314.7 LBS | RESPIRATION RATE: 17 BRPM | SYSTOLIC BLOOD PRESSURE: 122 MMHG

## 2024-12-07 LAB
ALBUMIN SERPL BCP-MCNC: 3.4 G/DL (ref 3.4–5)
ANION GAP SERPL CALC-SCNC: 11 MMOL/L (ref 10–20)
BUN SERPL-MCNC: 29 MG/DL (ref 6–23)
CALCIUM SERPL-MCNC: 9 MG/DL (ref 8.6–10.3)
CHLORIDE SERPL-SCNC: 108 MMOL/L (ref 98–107)
CO2 SERPL-SCNC: 26 MMOL/L (ref 21–32)
CREAT SERPL-MCNC: 1.59 MG/DL (ref 0.5–1.3)
EGFRCR SERPLBLD CKD-EPI 2021: 45 ML/MIN/1.73M*2
ERYTHROCYTE [DISTWIDTH] IN BLOOD BY AUTOMATED COUNT: 20.6 % (ref 11.5–14.5)
GLUCOSE SERPL-MCNC: 114 MG/DL (ref 74–99)
HCT VFR BLD AUTO: 27.6 % (ref 41–52)
HGB BLD-MCNC: 7.5 G/DL (ref 13.5–17.5)
MAGNESIUM SERPL-MCNC: 2.13 MG/DL (ref 1.6–2.4)
MCH RBC QN AUTO: 19.9 PG (ref 26–34)
MCHC RBC AUTO-ENTMCNC: 27.2 G/DL (ref 32–36)
MCV RBC AUTO: 73 FL (ref 80–100)
NRBC BLD-RTO: 0 /100 WBCS (ref 0–0)
PHOSPHATE SERPL-MCNC: 3.6 MG/DL (ref 2.5–4.9)
PLATELET # BLD AUTO: 184 X10*3/UL (ref 150–450)
POTASSIUM SERPL-SCNC: 3.6 MMOL/L (ref 3.5–5.3)
RBC # BLD AUTO: 3.76 X10*6/UL (ref 4.5–5.9)
SODIUM SERPL-SCNC: 141 MMOL/L (ref 136–145)
WBC # BLD AUTO: 5.2 X10*3/UL (ref 4.4–11.3)

## 2024-12-07 PROCEDURE — 2500000004 HC RX 250 GENERAL PHARMACY W/ HCPCS (ALT 636 FOR OP/ED)

## 2024-12-07 PROCEDURE — 99232 SBSQ HOSP IP/OBS MODERATE 35: CPT | Performed by: NURSE PRACTITIONER

## 2024-12-07 PROCEDURE — 2500000002 HC RX 250 W HCPCS SELF ADMINISTERED DRUGS (ALT 637 FOR MEDICARE OP, ALT 636 FOR OP/ED): Performed by: STUDENT IN AN ORGANIZED HEALTH CARE EDUCATION/TRAINING PROGRAM

## 2024-12-07 PROCEDURE — 85027 COMPLETE CBC AUTOMATED: CPT

## 2024-12-07 PROCEDURE — 2500000001 HC RX 250 WO HCPCS SELF ADMINISTERED DRUGS (ALT 637 FOR MEDICARE OP): Performed by: INTERNAL MEDICINE

## 2024-12-07 PROCEDURE — 83735 ASSAY OF MAGNESIUM: CPT

## 2024-12-07 PROCEDURE — 36415 COLL VENOUS BLD VENIPUNCTURE: CPT

## 2024-12-07 PROCEDURE — 94640 AIRWAY INHALATION TREATMENT: CPT

## 2024-12-07 PROCEDURE — 2500000004 HC RX 250 GENERAL PHARMACY W/ HCPCS (ALT 636 FOR OP/ED): Performed by: INTERNAL MEDICINE

## 2024-12-07 PROCEDURE — 99239 HOSP IP/OBS DSCHRG MGMT >30: CPT | Performed by: INTERNAL MEDICINE

## 2024-12-07 PROCEDURE — 2500000001 HC RX 250 WO HCPCS SELF ADMINISTERED DRUGS (ALT 637 FOR MEDICARE OP)

## 2024-12-07 PROCEDURE — 2500000004 HC RX 250 GENERAL PHARMACY W/ HCPCS (ALT 636 FOR OP/ED): Performed by: STUDENT IN AN ORGANIZED HEALTH CARE EDUCATION/TRAINING PROGRAM

## 2024-12-07 PROCEDURE — 94760 N-INVAS EAR/PLS OXIMETRY 1: CPT

## 2024-12-07 PROCEDURE — 80069 RENAL FUNCTION PANEL: CPT

## 2024-12-07 RX ORDER — FUROSEMIDE 10 MG/ML
40 INJECTION INTRAMUSCULAR; INTRAVENOUS ONCE
Status: COMPLETED | OUTPATIENT
Start: 2024-12-07 | End: 2024-12-07

## 2024-12-07 RX ORDER — POLYETHYLENE GLYCOL 3350 17 G/17G
17 POWDER, FOR SOLUTION ORAL DAILY
Qty: 30 PACKET | Refills: 0 | Status: SHIPPED | OUTPATIENT
Start: 2024-12-07 | End: 2025-01-06

## 2024-12-07 RX ORDER — FUROSEMIDE 40 MG/1
40 TABLET ORAL
Qty: 60 TABLET | Refills: 0 | Status: SHIPPED | OUTPATIENT
Start: 2024-12-07 | End: 2025-01-06

## 2024-12-07 RX ORDER — PANTOPRAZOLE SODIUM 40 MG/1
40 TABLET, DELAYED RELEASE ORAL 2 TIMES DAILY
Qty: 60 TABLET | Refills: 0 | Status: SHIPPED | OUTPATIENT
Start: 2024-12-07 | End: 2025-01-06

## 2024-12-07 RX ADMIN — IPRATROPIUM BROMIDE AND ALBUTEROL SULFATE 3 ML: 2.5; .5 SOLUTION RESPIRATORY (INHALATION) at 13:18

## 2024-12-07 RX ADMIN — METOPROLOL SUCCINATE 25 MG: 25 TABLET, EXTENDED RELEASE ORAL at 08:38

## 2024-12-07 RX ADMIN — IRON SUCROSE 300 MG: 20 INJECTION, SOLUTION INTRAVENOUS at 05:55

## 2024-12-07 RX ADMIN — METHYLPREDNISOLONE SODIUM SUCCINATE 40 MG: 40 INJECTION, POWDER, FOR SOLUTION INTRAMUSCULAR; INTRAVENOUS at 08:41

## 2024-12-07 RX ADMIN — ALLOPURINOL 300 MG: 300 TABLET ORAL at 08:38

## 2024-12-07 RX ADMIN — Medication 1000 UNITS: at 08:39

## 2024-12-07 RX ADMIN — AZITHROMYCIN DIHYDRATE 500 MG: 500 TABLET ORAL at 08:38

## 2024-12-07 RX ADMIN — PANTOPRAZOLE SODIUM 40 MG: 40 INJECTION, POWDER, FOR SOLUTION INTRAVENOUS at 08:41

## 2024-12-07 RX ADMIN — FUROSEMIDE 40 MG: 10 INJECTION, SOLUTION INTRAMUSCULAR; INTRAVENOUS at 09:56

## 2024-12-07 RX ADMIN — GUAIFENESIN 1200 MG: 600 TABLET ORAL at 08:39

## 2024-12-07 RX ADMIN — FINASTERIDE 5 MG: 5 TABLET, FILM COATED ORAL at 08:38

## 2024-12-07 RX ADMIN — APIXABAN 5 MG: 5 TABLET, FILM COATED ORAL at 09:56

## 2024-12-07 RX ADMIN — EPLERENONE 25 MG: 25 TABLET, FILM COATED ORAL at 08:38

## 2024-12-07 RX ADMIN — EMPAGLIFLOZIN 25 MG: 25 TABLET, FILM COATED ORAL at 08:45

## 2024-12-07 RX ADMIN — IPRATROPIUM BROMIDE AND ALBUTEROL SULFATE 3 ML: 2.5; .5 SOLUTION RESPIRATORY (INHALATION) at 08:26

## 2024-12-07 RX ADMIN — OXYCODONE HYDROCHLORIDE AND ACETAMINOPHEN 500 MG: 500 TABLET ORAL at 08:39

## 2024-12-07 ASSESSMENT — COGNITIVE AND FUNCTIONAL STATUS - GENERAL
DRESSING REGULAR LOWER BODY CLOTHING: A LITTLE
CLIMB 3 TO 5 STEPS WITH RAILING: A LOT
DAILY ACTIVITIY SCORE: 20
MOBILITY SCORE: 17
DRESSING REGULAR UPPER BODY CLOTHING: A LITTLE
TURNING FROM BACK TO SIDE WHILE IN FLAT BAD: A LITTLE
WALKING IN HOSPITAL ROOM: A LITTLE
MOVING TO AND FROM BED TO CHAIR: A LITTLE
STANDING UP FROM CHAIR USING ARMS: A LITTLE
MOVING FROM LYING ON BACK TO SITTING ON SIDE OF FLAT BED WITH BEDRAILS: A LITTLE
TOILETING: A LITTLE
HELP NEEDED FOR BATHING: A LITTLE

## 2024-12-07 ASSESSMENT — PAIN SCALES - GENERAL: PAINLEVEL_OUTOF10: 0 - NO PAIN

## 2024-12-07 NOTE — HOSPITAL COURSE
74-year-old male who [facility] with A-fib on Eliquis, CAD status post stent placement, hypertension, CKD, ANGELITO on CPAP, COPD, chronic lymphedema, BPH, gastric AVM in February 2024 presented for acute blood loss anemia with increasing shortness of breath with standing for 2 weeks with weakness and lethargy.  He was found to have significant anemia requiring transfusion.  Patient was also found to have acute hypoxemic respiratory failure because he stopped taking his Lasix and anemia likely because high-output heart failure.  He was seen and evaluated by pulmonology who cleared him for EGD and colonoscopy.  Patient went for that on 12/6/2024 and was found to have severe GERD diverticuli with large stool burden.  He was asked to increase his fiber intake.  He can resume his anticoagulation on 12/7/2024 but he absolutely wants to go home and follow-up with his [facility].  He qualifies for 4 L nasal cannula.  GI cleared him to resume his anticoagulation and patient understands that if he sees any signs and symptoms of bleeding he will have to come back to the hospital.  He was also given IV iron for iron deficiency anemia.  We are holding off on his baby aspirin daily given his history and asking him to follow-up with cardiologist regarding long-term management of anticoagulation and considering potentially Watchman device and doing antiplatelet therapy.  He will be discharged home with home health once oxygen is set up.  He has a standing order for repeat CBC on Monday    48 minutes spent in discharge timing

## 2024-12-07 NOTE — PROGRESS NOTES
Respiratory Therapy Note:   Home Oxygen Evaluation        Date/Time SpO2 Medical Gas Therapy Medical Gas Delivery Method Oxygen L/min Patient is on During the Study Patient Activity During Study    12/07/24 1018 90 %  None (Room air)  --  --  At rest     12/07/24 1019 85 %  None (Room air)  --  --  Ambulating     12/07/24 1021 90 %  Supplemental oxygen  Nasal cannula  4L  4LPM Ambulating                  Patient qualifies for home oxygen

## 2024-12-07 NOTE — NURSING NOTE
Pt to be discharged on home oxygen, see notes, waiting for oxygen to be  delivered, pt will call ride once tank is delivered.     1400 oxygen tank is here, pt called son, asked him to be here at 230pm. Discharge papers given, pt gave verbal understanding.     1430 discharged home, son here to transport. Pt to car with 4L o2 on.

## 2024-12-07 NOTE — DISCHARGE SUMMARY
Discharge Diagnosis  Anemia due to blood loss, acute    Issues Requiring Follow-Up  We are holding on aspirin  We are asking you to maintain low-salt diet and Fluid restriction  [facility] regarding potentially looking into Watchman device  We are recommending a repeat CBC on Monday, 12/9/2024  We also ask you to increase your fiber intake and prescribe you MiraLAX daily for severe colonic diverticuli    Discharge Meds     Medication List      START taking these medications     polyethylene glycol 17 gram packet; Commonly known as: Glycolax,   Miralax; Take 17 g by mouth once daily.     CHANGE how you take these medications     furosemide 40 mg tablet; Commonly known as: Lasix; Take 1 tablet (40 mg)   by mouth 2 times daily (morning and late afternoon).; What changed: when   to take this   pantoprazole 40 mg EC tablet; Commonly known as: ProtoNix; Take 1 tablet   (40 mg) by mouth 2 times a day. Do not crush, chew, or split.; What   changed: when to take this     CONTINUE taking these medications     allopurinol 300 mg tablet; Commonly known as: Zyloprim   ascorbic acid 500 mg tablet; Commonly known as: Vitamin C   atorvastatin 80 mg tablet; Commonly known as: Lipitor   cholecalciferol 25 MCG (1000 UT) tablet; Commonly known as: Vitamin D-3   Eliquis 5 mg tablet; Generic drug: apixaban   eplerenone 25 mg tablet; Commonly known as: Inspra   finasteride 5 mg tablet; Commonly known as: Proscar   Flomax 0.4 mg 24 hr capsule; Generic drug: tamsulosin   fluticasone 50 mcg/actuation nasal spray; Commonly known as: Flonase   Jardiance 25 mg; Generic drug: empagliflozin   metoprolol succinate XL 25 mg 24 hr tablet; Commonly known as:   Toprol-XL; Take 1 tablet (25 mg) by mouth once daily. Do not crush or   chew. Do not start before March 2, 2024.   multivitamin with minerals tablet   mupirocin 2 % ointment; Commonly known as: Bactroban   traMADol 50 mg tablet; Commonly known as: Ultram   zinc  gluconate 50 mg tablet     STOP taking these medications     aspirin 81 mg EC tablet   polyethylene glycol 236-22.74-6.74 -5.86 gram solution; Commonly known   as: Golytely       Test Results Pending At Discharge  Pending Labs       Order Current Status    Surgical Pathology Exam Collected (12/06/24 1421)            Hospital Course  74-year-old male who [facility] with A-fib on Eliquis, CAD status post stent placement, hypertension, CKD, ANGELITO on CPAP, COPD, chronic lymphedema, BPH, gastric AVM in February 2024 presented for acute blood loss anemia with increasing shortness of breath with standing for 2 weeks with weakness and lethargy.  He was found to have significant anemia requiring transfusion.  Patient was also found to have acute hypoxemic respiratory failure because he stopped taking his Lasix and anemia likely because high-output heart failure.  He was seen and evaluated by pulmonology who cleared him for EGD and colonoscopy.  Patient went for that on 12/6/2024 and was found to have severe colonic diverticuli with large stool burden.  He was asked to increase his fiber intake.  He can resume his anticoagulation on 12/7/2024 but he absolutely wants to go home and follow-up with his [facility].  He qualifies for 4 L nasal cannula.  GI cleared him to resume his anticoagulation and patient understands that if he sees any signs and symptoms of bleeding he will have to come back to the hospital.  He was also given IV iron for iron deficiency anemia.  We are holding off on his baby aspirin daily given his history and asking him to follow-up with cardiologist regarding long-term management of anticoagulation and considering potentially Watchman device and doing antiplatelet therapy.  He will be discharged home with home health once oxygen is set up.  He has a standing order for repeat CBC on Monday    48 minutes spent in discharge timing    Pertinent Physical Exam At  Time of Discharge  General: Not in acute distress, alert.  Obese on 4 L nasal cannula  HEENT: PERRLA, head intact and normocephalic  Neck: Normal to inspection  Lungs: Clear to auscultation, work of breathing within normal limit  Cardiac: Regular rate and rhythm  Abdomen: Soft nontender, positive bowel sounds  : Exam deferred  Skin: Intact  Hematology: No petechia or excessive ecchymosis  Musculoskeletal: Without significant trauma.  Bilateral lower extremity edema is noted  Neurological: Alert awake oriented, no focal deficit, cranial nerves grossly intact  Psych: No suicidal ideation or homicidal ideation    Outpatient Follow-Up  No future appointments.      Yan Tanner MD

## 2024-12-07 NOTE — PROGRESS NOTES
"Reyes Cornelius is a 74 y.o. male on day 3 of admission presenting with Anemia due to blood loss, acute.    Subjective   Patient seen and examined at bedside this morning. No events reported overnight. Remained hemodynamically stable and afebrile. Denying any abdominal pain, nausea, vomiting, melena or gross red blood per rectum. Hgb 7.5    Objective   Constitutional:       General: No in acute distress.     Appearance: Normal appearance.   HENT:      Head: Normocephalic and atraumatic.      Mouth/Throat:      Mouth: Mucous membranes are moist.   Eyes:      Conjunctiva/sclera: Conjunctivae normal.   Cardiovascular:      Rate and Rhythm: Normal rate and regular rhythm. LE lymphedema.   Pulmonary:      Effort: Pulmonary effort is normal. No respiratory distress.      Breath sounds: Normal breath sounds. No stridor. No wheezing or rhonchi.   Skin:     General: Skin is warm and dry.      Coloration: Skin is not jaundiced.   Neurological:      General: No focal deficit present.      Mental Status: Alert and oriented to person, place, and time. Mental status is at baseline.   Psychiatric:         Mood and Affect: Mood normal.         Behavior: Behavior normal.         Judgment: Judgment normal.     Last Recorded Vitals  Blood pressure 141/79, pulse 102, temperature 36.9 °C (98.4 °F), temperature source Temporal, resp. rate 20, height 1.854 m (6' 1\"), weight 143 kg (314 lb 11.2 oz), SpO2 90%.  Intake/Output last 3 Shifts:  I/O last 3 completed shifts:  In: 640 (4.5 mL/kg) [P.O.:240; IV Piggyback:400]  Out: 3250 (22.8 mL/kg) [Urine:3250 (0.6 mL/kg/hr)]  Weight: 142.7 kg     Relevant Results  Colonoscopy Diagnostic    Result Date: 12/6/2024  Table formatting from the original result was not included. Impression Extensive diverticulosis of severe severity containing stool and causing moderate luminal narrowing Single 8 mm angioectasia in the ascending colon; induced coagulation and hemostasis achieved with argon plasma " coagulation Findings Multiple small, medium and large, extensive pancolonic severe diverticula containing stool and causing moderate luminal narrowing Single 8 mm angioectasia in the ascending colon; induced coagulation and hemostasis achieved with 1 application of with argon plasma coagulation  Recommendation Watch for complications Examination today was not adequate for screening purposes High fiber diet Resume regular diet Outpatient GI clinic follow up Can cancel upcoming endoscopy appointments  Indication Anemia due to blood loss, acute Staff Staff Role Shaggy Balbuena MD Proceduralist Medications See Anesthesia Record. Preprocedure A history and physical has been performed, and patient medication allergies have been reviewed. The patient's tolerance of previous anesthesia has been reviewed. The risks and benefits of the procedure and the sedation options and risks were discussed with the patient. All questions were answered and informed consent obtained. Details of the Procedure The patient underwent monitored anesthesia care, which was administered by an anesthesia professional. The patient's blood pressure, ECG, ETCO2, heart rate, level of consciousness, oxygen and respirations were monitored throughout the procedure. A digital rectal exam was performed. The scope was introduced through the anus and advanced to the cecum. Retroflexion was performed in the rectum. The quality of bowel preparation was evaluated using the [place] Bowel Preparation Scale with scores of: right colon = 2, transverse colon = 1, left colon = 0. The total BBPS score was 3. Bowel prep was not adequate. The patient experienced no blood loss. The procedure was difficult due to fixation and loops in the digestive tract. In response to procedure difficulty, counter pressure was applied, loop reduction was employed and water immersion technqiue was employed. The patient tolerated the procedure well. There were no apparent adverse events. Events  Procedure Events Event Event Time ENDO SCOPE IN TIME 12/6/2024  2:08 PM ENDO SCOPE OUT TIME 12/6/2024  2:23 PM ENDO SCOPE IN TIME 12/6/2024  2:30 PM ENDO CECUM REACHED 12/6/2024  2:40 PM ENDO SCOPE OUT TIME 12/6/2024  2:46 PM Specimens ID Type Source Tests Collected by Time 1 : r/o h. pylori Tissue STOMACH BODY/CORPUS BIOPSY SURGICAL PATHOLOGY EXAM Julita Robbins MA 12/6/2024 1421 Procedure Location [facility] [address] [facility] [id number] [phone] Referring Provider Ely Dillon, APRN-CNP Procedure Provider MD Ely Hemphill     Esophagogastroduodenoscopy (EGD)    Result Date: 12/6/2024  Table formatting from the original result was not included. Impression The cricopharynx, upper third of the esophagus, middle third of the esophagus, lower third of the esophagus and GE junction appeared normal. Moderate edematous, erythematous mucosa in the cardia, fundus of the stomach, body of the stomach and antrum, consistent with gastritis; performed cold forceps biopsy 2 4 mm angioectasias in the duodenal bulb vs scope trauma. Considering these were noted prior, it was felt that this was likely an AVM and treated as such with APC. The 2nd part of the duodenum appeared normal. Findings The cricopharynx, upper third of the esophagus, middle third of the esophagus, lower third of the esophagus and GE junction appeared normal. Moderate edematous and erythematous mucosa in the cardia, fundus of the stomach, body of the stomach and antrum, consistent with gastritis; performed cold forceps biopsy 2 4 mm angioectasias in the duodenal bulb; there was stigmata of recent hemorrhage; 2 lesions were ablated with 4 applications of argon plasma coagulation using a straight fire probe (20 W and 1.5 mL/min flow rate), coagulum was not removed The 2nd part of the duodenum appeared normal. Recommendation Proceed with colonoscopy  Indication Anemia due to blood loss, acute  Staff Staff Role Shaggy Balbuena MD Proceduralist Medications See Anesthesia Record. Preprocedure A history and physical has been performed, and patient medication allergies have been reviewed. The patient's tolerance of previous anesthesia has been reviewed. The risks and benefits of the procedure and the sedation options and risks were discussed with the patient. All questions were answered and informed consent obtained. Details of the Procedure The patient underwent monitored anesthesia care, which was administered by an anesthesia professional. The patient's blood pressure, ECG, ETCO2, heart rate, level of consciousness, oxygen and respirations were monitored throughout the procedure. The scope was introduced through the mouth and advanced to the second part of the duodenum. Retroflexion was performed in the cardia. Prior to the procedure, the patient's H. Pylori status was unknown. The patient experienced no blood loss. The procedure was not difficult. The patient tolerated the procedure well. There were no apparent adverse events. Events Procedure Events Event Event Time ENDO SCOPE IN TIME 12/6/2024  2:08 PM Specimens ID Type Source Tests Collected by Time 1 : r/o h. pylori Tissue STOMACH BODY/CORPUS BIOPSY SURGICAL PATHOLOGY EXAM Julita Robbins MA 12/6/2024 1421 Procedure Location [facility] [address] [facility] [id number] [phone] Referring Provider DEION Moreno-CNP Procedure Provider Giovanna Kaplan MD     Lab Results   Component Value Date    WBC 5.2 12/07/2024    HGB 7.5 (L) 12/07/2024    HCT 27.6 (L) 12/07/2024    MCV 73 (L) 12/07/2024     12/07/2024     Lab Results   Component Value Date    ALT 6 (L) 12/04/2024    AST 10 12/04/2024    ALKPHOS 98 12/04/2024    BILITOT 1.1 12/04/2024     Lab Results   Component Value Date    GLUCOSE 114 (H) 12/07/2024    CALCIUM 9.0 12/07/2024     12/07/2024    K 3.6 12/07/2024    CO2 26 12/07/2024      (H) 12/07/2024    BUN 29 (H) 12/07/2024    CREATININE 1.59 (H) 12/07/2024     Lab Results   Component Value Date    INR 1.6 (H) 12/05/2024    INR 2.0 (H) 12/04/2024    INR 1.5 (H) 02/28/2024    PROTIME 18.6 (H) 12/05/2024    PROTIME 22.6 (H) 12/04/2024    PROTIME 16.8 (H) 02/28/2024            Assessment/Plan   Reyes Cornelius is a 74 y.o. 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  Patient is symptomatic with increasing SOB with standing x 2 weeks, weakness and lethargy. Patient on 4 L N/C which she is not on at home.  He is pursed lipped breathing and short of breath at rest and during initial consult.       12/6/24  S/p EGD  The cricopharynx, upper third of the esophagus, middle third of the esophagus, lower third of the esophagus and GE junction appeared normal.  Moderate edematous, erythematous mucosa in the cardia, fundus of the stomach, body of the stomach and antrum, consistent with gastritis; performed cold forceps biopsy  2  4 mm angioectasias in the duodenal bulb vs scope trauma. Considering these were noted prior, it was felt that this was likely an AVM and treated as such with APC.   The 2nd part of the duodenum appeared normal.     S/p colonoscopy  Extensive diverticulosis of severe severity containing stool and causing moderate luminal narrowing  Single 8 mm angioectasia in the ascending colon; induced coagulation and hemostasis achieved with argon plasma coagulation    Acute on chronic anemia  Gastritis  Angioectasias in the duodenal bulb and ascending colon ,s/p APC.  Iron deficiency anemia   Long-term use of anticoagulant on Eliquis for A-fib  CKD     Plan:  - continue supportive care  - continue to trend hgb daily unless pt becomes symptomatic or decompensates  - transfuse to maintain hgb >7  - continue to monitor for and document overt signs of bleeding  - no  NSAIDS  - agree with IV iron  -Watch for complications status post APC  -Resume regular diet, high-fiber diet  -Follow-up with GI outpatient in 2-3 weeks    I spent 25 minutes in the professional and overall care of this patient.      DEION Joseph-CNP

## 2024-12-07 NOTE — NURSING NOTE
Patient is A&Ox4, villa any pain just some discomfort from chair/bed., patient remain on 4L of acute O2 NC., with CPAP at night, patient is up with SBA and walker, patient resting in bed, safety maintained call light within reach.

## 2024-12-07 NOTE — NURSING NOTE
ADOD: 12/7.   Destination: home  Transportation Provided by: Family Member  Patient feels updated by provider(s) and involved in POC.   Patient has been advised to defer to AVS for new medications and follow-up visits.   Patient is active with [facility].  Patient's [facility].  Appointments will be made by patient.  Patient has been notified about a survey and call back is to be expected 1-2 weeks post discharge.   Notified patient that DC Navigator is available everyday throughout their stay if any assistance is needed.     [facility]. Office is closed, spoke with patient and obtain [employer] and Medicaid cards and asked Lashanda in admitting to apply them to patients chart. Updated RT, she is reaching out to Simon with [facility]  Faxed insurance cards, face sheet and RT note to Simon at [facility], She spoke with patient on the phone and possible costs to patient were discussed.  02 set up will be completed prior to discharge

## 2024-12-08 LAB
BLOOD EXPIRATION DATE: NORMAL
BLOOD EXPIRATION DATE: NORMAL
DISPENSE STATUS: NORMAL
DISPENSE STATUS: NORMAL
PRODUCT BLOOD TYPE: 6200
PRODUCT BLOOD TYPE: 6200
PRODUCT CODE: NORMAL
PRODUCT CODE: NORMAL
UNIT ABO: NORMAL
UNIT ABO: NORMAL
UNIT NUMBER: NORMAL
UNIT NUMBER: NORMAL
UNIT RH: NORMAL
UNIT RH: NORMAL
UNIT VOLUME: 350
UNIT VOLUME: 350
XM INTEP: NORMAL
XM INTEP: NORMAL

## 2024-12-09 ENCOUNTER — PATIENT OUTREACH (OUTPATIENT)
Dept: CARE COORDINATION | Facility: CLINIC | Age: 74
End: 2024-12-09
Payer: MEDICARE

## 2024-12-09 PROBLEM — D50.9 IRON DEFICIENCY ANEMIA, UNSPECIFIED: Status: ACTIVE | Noted: 2024-12-09

## 2024-12-09 PROBLEM — Z95.828 PRESENCE OF OTHER VASCULAR IMPLANTS AND GRAFTS: Status: ACTIVE | Noted: 2024-12-09

## 2024-12-09 PROBLEM — Z95.828 HISTORY OF ENDOVASCULAR STENT GRAFT FOR ABDOMINAL AORTIC ANEURYSM (AAA): Status: ACTIVE | Noted: 2024-12-09

## 2024-12-09 PROBLEM — Z77.29 EXPOSURE TO POTENTIALLY HAZARDOUS SUBSTANCE: Status: ACTIVE | Noted: 2024-12-09

## 2024-12-09 PROBLEM — I50.9 HEART FAILURE, UNSPECIFIED: Status: ACTIVE | Noted: 2024-12-09

## 2024-12-09 SDOH — ECONOMIC STABILITY: GENERAL: WOULD YOU LIKE HELP WITH ANY OF THE FOLLOWING NEEDS?: I DONT NEED HELP WITH ANY OF THESE

## 2024-12-09 NOTE — PROGRESS NOTES
Outreach call to patient to support a smooth transition of care from recent admission.  Spoke with patient, reviewed discharge medications, discharge instructions, assessed social needs, and provided education on importance of follow-up appointment with provider.  Will continue to monitor through transition period.  Medications  Medications reviewed with patient/caregiver?: Yes (Patient stated he is voiding alot with his diuretic and barely makes it to the bathroom.  Advised him to discuss this at his appointment tomorrow.  germán) (12/9/2024  9:23 AM)  Is the patient having any side effects they believe may be caused by any medication additions or changes?: No (12/9/2024  9:23 AM)  Does the patient have all medications ordered at discharge?: Yes (12/9/2024  9:23 AM)  Is the patient taking all medications as directed (includes completed medication regime)?: Yes (12/9/2024  9:23 AM)  Care Management Interventions: Provided patient education (12/9/2024  9:23 AM)    Appointments  Does the patient have a primary care provider?: Yes (12/9/2024  9:23 AM)  Has the patient kept scheduled appointments due by today?: Yes ([facility], he has an appointment tomorrow.germán) (12/9/2024  9:23 AM)  Care Management Interventions: Advised patient to keep appointment (12/9/2024  9:23 AM)    Patient Teaching  Does the patient have access to their discharge instructions?: Yes (12/9/2024  9:23 AM)  Care Management Interventions: Reviewed instructions with patient (12/9/2024  9:23 AM)  What is the patient's perception of their health status since discharge?: Improving (12/9/2024  9:23 AM)      Priya PAULN, RN, [facility] Organization  O: [phone]

## 2024-12-11 ENCOUNTER — PATIENT OUTREACH (OUTPATIENT)
Dept: CARE COORDINATION | Facility: CLINIC | Age: 74
End: 2024-12-11
Payer: MEDICARE

## 2024-12-11 NOTE — PROGRESS NOTES
Outreach call to patient following up on appointment with care provider.  He did [facility] Mabry yesterday[facility].   He stated he was discharged home on 4L of home O2 and needs to have an order for it to be continued.  He asked if I could message his  doctor, Dr. Chapman, which I did.    Discussed appointment, reviewed medications, and discussed plan of care.  Patient with no additional questions at this time.  Will continue to follow.      Priya PAULN, RN, [facility]  Accountable Care Organization  O: [phone]

## 2024-12-12 ENCOUNTER — APPOINTMENT (OUTPATIENT)
Dept: GASTROENTEROLOGY | Facility: HOSPITAL | Age: 74
End: 2024-12-12
Payer: MEDICARE

## 2024-12-16 ENCOUNTER — TELEPHONE (OUTPATIENT)
Dept: GASTROENTEROLOGY | Facility: CLINIC | Age: 74
End: 2024-12-16
Payer: MEDICARE

## 2024-12-16 NOTE — TELEPHONE ENCOUNTER
Spoke with patient. I let him know we do not have authorization for the VCE at this time. I do not see that Dr. Kaplan wanted him to proceed at this time either.   Patient will reach out to [facility] to discuss.

## 2024-12-18 ENCOUNTER — TELEPHONE (OUTPATIENT)
Dept: GASTROENTEROLOGY | Facility: CLINIC | Age: 74
End: 2024-12-18
Payer: MEDICARE

## 2024-12-18 NOTE — TELEPHONE ENCOUNTER
Dr. Duenas had previously seen this patient inpatient in March. He thought a VCE was best, however at the time the [facility] did not approve this procedure.     He was recently seen inpatient on 12/6 and had 2 procedures.     Do you think he needs to proceed with VCE still?

## 2025-01-10 ENCOUNTER — TELEPHONE (OUTPATIENT)
Dept: GASTROENTEROLOGY | Facility: CLINIC | Age: 75
End: 2025-01-10
Payer: MEDICARE

## 2025-01-10 ENCOUNTER — PATIENT OUTREACH (OUTPATIENT)
Dept: CARE COORDINATION | Facility: CLINIC | Age: 75
End: 2025-01-10
Payer: MEDICARE

## 2025-01-10 NOTE — PROGRESS NOTES
Attempted outreach call to patient to check in 30 days after hospital discharge to support smooth transition of care.  Unable to leave a voice message with my contact information, phone busy.  No additional outreach needed at this time.    germán PAULN, RN, [facility] Organization  O: [phone]

## 2025-01-10 NOTE — TELEPHONE ENCOUNTER
Spoke with patient. Advised him at this time we do not have current orders for the VCE. Per Dr. Balbuena he would need to follow up in clinic to determine need for this study.   Asked patient if he had [facility] with [facility] he states he is working on this at this time.   NO orders for VCE or follow up scheduled at this time.